# Patient Record
Sex: FEMALE | Race: BLACK OR AFRICAN AMERICAN | Employment: UNEMPLOYED | URBAN - METROPOLITAN AREA
[De-identification: names, ages, dates, MRNs, and addresses within clinical notes are randomized per-mention and may not be internally consistent; named-entity substitution may affect disease eponyms.]

---

## 2020-01-13 ENCOUNTER — HOSPITAL ENCOUNTER (INPATIENT)
Age: 60
LOS: 8 days | Discharge: HOME | DRG: 885 | End: 2020-01-21
Attending: EMERGENCY MEDICINE | Admitting: PSYCHIATRY & NEUROLOGY
Payer: COMMERCIAL

## 2020-01-13 DIAGNOSIS — F32.A DEPRESSION, UNSPECIFIED DEPRESSION TYPE: Primary | ICD-10-CM

## 2020-01-13 LAB
ALBUMIN SERPL-MCNC: 3.5 G/DL (ref 3.5–5)
ALBUMIN/GLOB SERPL: 0.9 {RATIO} (ref 1.1–2.2)
ALP SERPL-CCNC: 110 U/L (ref 45–117)
ALT SERPL-CCNC: 20 U/L (ref 12–78)
AMPHET UR QL SCN: NEGATIVE
ANION GAP SERPL CALC-SCNC: 4 MMOL/L (ref 5–15)
APPEARANCE UR: CLEAR
AST SERPL-CCNC: 12 U/L (ref 15–37)
BACTERIA URNS QL MICRO: NEGATIVE /HPF
BARBITURATES UR QL SCN: NEGATIVE
BASOPHILS # BLD: 0 K/UL (ref 0–0.1)
BASOPHILS NFR BLD: 1 % (ref 0–1)
BENZODIAZ UR QL: NEGATIVE
BILIRUB SERPL-MCNC: 0.3 MG/DL (ref 0.2–1)
BILIRUB UR QL: NEGATIVE
BUN SERPL-MCNC: 13 MG/DL (ref 6–20)
BUN/CREAT SERPL: 11 (ref 12–20)
CALCIUM SERPL-MCNC: 9 MG/DL (ref 8.5–10.1)
CANNABINOIDS UR QL SCN: NEGATIVE
CHLORIDE SERPL-SCNC: 110 MMOL/L (ref 97–108)
CO2 SERPL-SCNC: 30 MMOL/L (ref 21–32)
COCAINE UR QL SCN: POSITIVE
COLOR UR: NORMAL
CREAT SERPL-MCNC: 1.16 MG/DL (ref 0.55–1.02)
DIFFERENTIAL METHOD BLD: ABNORMAL
DRUG SCRN COMMENT,DRGCM: ABNORMAL
EOSINOPHIL # BLD: 0.1 K/UL (ref 0–0.4)
EOSINOPHIL NFR BLD: 2 % (ref 0–7)
EPITH CASTS URNS QL MICRO: NORMAL /LPF
ERYTHROCYTE [DISTWIDTH] IN BLOOD BY AUTOMATED COUNT: 13.2 % (ref 11.5–14.5)
ETHANOL SERPL-MCNC: <10 MG/DL
GLOBULIN SER CALC-MCNC: 3.9 G/DL (ref 2–4)
GLUCOSE SERPL-MCNC: 91 MG/DL (ref 65–100)
GLUCOSE UR STRIP.AUTO-MCNC: NEGATIVE MG/DL
HCT VFR BLD AUTO: 41.2 % (ref 35–47)
HGB BLD-MCNC: 13 G/DL (ref 11.5–16)
HGB UR QL STRIP: NEGATIVE
IMM GRANULOCYTES # BLD AUTO: 0 K/UL (ref 0–0.04)
IMM GRANULOCYTES NFR BLD AUTO: 0 % (ref 0–0.5)
KETONES UR QL STRIP.AUTO: NEGATIVE MG/DL
LEUKOCYTE ESTERASE UR QL STRIP.AUTO: NEGATIVE
LYMPHOCYTES # BLD: 2.3 K/UL (ref 0.8–3.5)
LYMPHOCYTES NFR BLD: 39 % (ref 12–49)
MCH RBC QN AUTO: 28.2 PG (ref 26–34)
MCHC RBC AUTO-ENTMCNC: 31.6 G/DL (ref 30–36.5)
MCV RBC AUTO: 89.4 FL (ref 80–99)
METHADONE UR QL: NEGATIVE
MONOCYTES # BLD: 0.4 K/UL (ref 0–1)
MONOCYTES NFR BLD: 7 % (ref 5–13)
NEUTS SEG # BLD: 3 K/UL (ref 1.8–8)
NEUTS SEG NFR BLD: 51 % (ref 32–75)
NITRITE UR QL STRIP.AUTO: NEGATIVE
NRBC # BLD: 0 K/UL (ref 0–0.01)
NRBC BLD-RTO: 0 PER 100 WBC
OPIATES UR QL: NEGATIVE
PCP UR QL: NEGATIVE
PH UR STRIP: 7.5 [PH] (ref 5–8)
PLATELET # BLD AUTO: 259 K/UL (ref 150–400)
PMV BLD AUTO: 8.6 FL (ref 8.9–12.9)
POTASSIUM SERPL-SCNC: 4.2 MMOL/L (ref 3.5–5.1)
PROT SERPL-MCNC: 7.4 G/DL (ref 6.4–8.2)
PROT UR STRIP-MCNC: NEGATIVE MG/DL
RBC # BLD AUTO: 4.61 M/UL (ref 3.8–5.2)
RBC #/AREA URNS HPF: NORMAL /HPF (ref 0–5)
SODIUM SERPL-SCNC: 144 MMOL/L (ref 136–145)
SP GR UR REFRACTOMETRY: 1.02 (ref 1–1.03)
UA: UC IF INDICATED,UAUC: NORMAL
UROBILINOGEN UR QL STRIP.AUTO: 1 EU/DL (ref 0.2–1)
WBC # BLD AUTO: 5.8 K/UL (ref 3.6–11)
WBC URNS QL MICRO: NORMAL /HPF (ref 0–4)

## 2020-01-13 PROCEDURE — 80053 COMPREHEN METABOLIC PANEL: CPT

## 2020-01-13 PROCEDURE — 74011250637 HC RX REV CODE- 250/637: Performed by: NURSE PRACTITIONER

## 2020-01-13 PROCEDURE — 81001 URINALYSIS AUTO W/SCOPE: CPT

## 2020-01-13 PROCEDURE — 80307 DRUG TEST PRSMV CHEM ANLYZR: CPT

## 2020-01-13 PROCEDURE — 90791 PSYCH DIAGNOSTIC EVALUATION: CPT

## 2020-01-13 PROCEDURE — 85025 COMPLETE CBC W/AUTO DIFF WBC: CPT

## 2020-01-13 PROCEDURE — 36415 COLL VENOUS BLD VENIPUNCTURE: CPT

## 2020-01-13 PROCEDURE — 65220000003 HC RM SEMIPRIVATE PSYCH

## 2020-01-13 PROCEDURE — 99284 EMERGENCY DEPT VISIT MOD MDM: CPT

## 2020-01-13 RX ORDER — TRAZODONE HYDROCHLORIDE 50 MG/1
50 TABLET ORAL
Status: DISCONTINUED | OUTPATIENT
Start: 2020-01-13 | End: 2020-01-16

## 2020-01-13 RX ORDER — HYDROXYZINE 25 MG/1
50 TABLET, FILM COATED ORAL
Status: DISCONTINUED | OUTPATIENT
Start: 2020-01-13 | End: 2020-01-21 | Stop reason: HOSPADM

## 2020-01-13 RX ORDER — ADHESIVE BANDAGE
30 BANDAGE TOPICAL DAILY PRN
Status: DISCONTINUED | OUTPATIENT
Start: 2020-01-13 | End: 2020-01-21 | Stop reason: HOSPADM

## 2020-01-13 RX ORDER — BENZTROPINE MESYLATE 1 MG/1
1 TABLET ORAL
Status: DISCONTINUED | OUTPATIENT
Start: 2020-01-13 | End: 2020-01-21 | Stop reason: HOSPADM

## 2020-01-13 RX ORDER — OLANZAPINE 5 MG/1
5 TABLET ORAL
Status: DISCONTINUED | OUTPATIENT
Start: 2020-01-13 | End: 2020-01-17

## 2020-01-13 RX ORDER — ACETAMINOPHEN 325 MG/1
650 TABLET ORAL
Status: COMPLETED | OUTPATIENT
Start: 2020-01-13 | End: 2020-01-13

## 2020-01-13 RX ORDER — DIPHENHYDRAMINE HYDROCHLORIDE 50 MG/ML
50 INJECTION, SOLUTION INTRAMUSCULAR; INTRAVENOUS
Status: DISCONTINUED | OUTPATIENT
Start: 2020-01-13 | End: 2020-01-21 | Stop reason: HOSPADM

## 2020-01-13 RX ORDER — CLONIDINE HYDROCHLORIDE 0.1 MG/1
0.1 TABLET ORAL
Status: DISCONTINUED | OUTPATIENT
Start: 2020-01-13 | End: 2020-01-21 | Stop reason: HOSPADM

## 2020-01-13 RX ORDER — LORAZEPAM 2 MG/ML
1 INJECTION INTRAMUSCULAR
Status: DISCONTINUED | OUTPATIENT
Start: 2020-01-13 | End: 2020-01-21 | Stop reason: HOSPADM

## 2020-01-13 RX ORDER — HALOPERIDOL 5 MG/ML
5 INJECTION INTRAMUSCULAR
Status: DISCONTINUED | OUTPATIENT
Start: 2020-01-13 | End: 2020-01-21 | Stop reason: HOSPADM

## 2020-01-13 RX ORDER — ACETAMINOPHEN 325 MG/1
650 TABLET ORAL
Status: DISCONTINUED | OUTPATIENT
Start: 2020-01-13 | End: 2020-01-18

## 2020-01-13 RX ADMIN — TRAZODONE HYDROCHLORIDE 50 MG: 50 TABLET ORAL at 21:03

## 2020-01-13 RX ADMIN — HYDROXYZINE HYDROCHLORIDE 50 MG: 25 TABLET, FILM COATED ORAL at 21:03

## 2020-01-13 RX ADMIN — CLONIDINE HYDROCHLORIDE 0.1 MG: 0.1 TABLET ORAL at 17:52

## 2020-01-13 RX ADMIN — ACETAMINOPHEN 650 MG: 325 TABLET ORAL at 15:20

## 2020-01-13 NOTE — PROGRESS NOTES
Problem: Depressed Mood (Adult/Pediatric)  Goal: *STG: State how the use of substances has aided the avoidance of feelings associated with loss  Outcome: Progressing Towards Goal

## 2020-01-13 NOTE — ED NOTES
Natacha Castillo from [facility] is conducting telepsych consult at this time.

## 2020-01-13 NOTE — ED NOTES
States she has plan to harm others, 2 sisters, son and friend, but no specific method as of yet. \"I know I'm going to do it because I know me\".
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
## 2020-01-13 NOTE — ED NOTES
Requesting medication for headache. Discussed with provider. Verbal order for 650 mg tylenol received.

## 2020-01-13 NOTE — ED NOTES
TRANSFER - OUT REPORT:    Verbal report given to Tonja RN(name) on Maddi Byers  being transferred to behavioral health(unit) for routine progression of care       Report consisted of patients Situation, Background, Assessment and   Recommendations(SBAR). Information from the following report(s) SBAR, ED Summary, [facility] and Recent Results was reviewed with the receiving nurse. Lines:       Opportunity for questions and clarification was provided.       Patient transported with:   security

## 2020-01-13 NOTE — BH NOTES
1700 Pt. Arrived to unit at 1700. Pt. Given meal tray and eating in the day room. Skin check performed by writer and Radha Pedraza RN. Personal belongings checked in by Fiz.

## 2020-01-13 NOTE — ED PROVIDER NOTES
61-year-old female with past medical history of hypertension and stroke that arrives via EMS for suicidal ideations. Patient reports feeling depressed for greater than 1 year. States symptoms worsen within the last 2 to 3 months. States she is having some suicidal ideations with thoughts of taking pills. Also reports homicidal ideations wanting to harm her 2 sisters however, she does not have a plan. Of note, patient has never been admitted to psychiatric services at this facility. Denies decreased concentration, inability to sleep or changes in appetite. Patient reports drug use. The history is provided by the patient. Suicidal   Pertinent negatives include no agitation. Pertinent negatives include no shortness of breath, no chest pain, no vomiting, no confusion, no headaches and no nausea. Past Medical History:   Diagnosis Date    Hypertension     Stroke [facility]) 2015       History reviewed. No pertinent surgical history. History reviewed. No pertinent family history. Social History     Socioeconomic History    Marital status: SINGLE     Spouse name: Not on file    Number of children: Not on file    Years of education: Not on file    Highest education level: Not on file   Occupational History    Not on file   Social Needs    Financial resource strain: Not on file    Food insecurity:     Worry: Not on file     Inability: Not on file    Transportation needs:     Medical: Not on file     Non-medical: Not on file   Tobacco Use    Smoking status: Current Every Day Smoker    Smokeless tobacco: Never Used   Substance and Sexual Activity    Alcohol use:  Yes    Drug use: Yes     Types: Marijuana    Sexual activity: Not on file   Lifestyle    Physical activity:     Days per week: Not on file     Minutes per session: Not on file    Stress: Not on file   Relationships    Social connections:     Talks on phone: Not on file     Gets together: Not on file     Attends Congregation service: Not on file     Active member of club or organization: Not on file     Attends meetings of clubs or organizations: Not on file     Relationship status: Not on file    Intimate partner violence:     Fear of current or ex partner: Not on file     Emotionally abused: Not on file     Physically abused: Not on file     Forced sexual activity: Not on file   Other Topics Concern    Not on file   Social History Narrative    Not on file         ALLERGIES: Patient has no known allergies. Review of Systems   Constitutional: Negative for appetite change, chills, fatigue and fever. HENT: Negative for congestion, ear pain and rhinorrhea. Eyes: Negative for pain and itching. Respiratory: Negative for cough, chest tightness, shortness of breath and wheezing. Cardiovascular: Negative for chest pain, palpitations and leg swelling. Gastrointestinal: Negative for abdominal pain, nausea and vomiting. Genitourinary: Negative for dysuria, frequency and urgency. Musculoskeletal: Negative for arthralgias, back pain and joint swelling. Skin: Negative for color change and rash. Neurological: Negative for dizziness, numbness and headaches. Psychiatric/Behavioral: Positive for suicidal ideas. Negative for agitation, confusion, decreased concentration, hallucinations and sleep disturbance. The patient is not nervous/anxious and is not hyperactive. All other systems reviewed and are negative. Vitals:    01/13/20 1525 01/13/20 1527 01/13/20 1627 01/13/20 1749   BP: 151/87  155/90 (!) 171/108   Pulse:   78 83   Resp:   16 18   Temp:   98.1 °F (36.7 °C) 98.4 °F (36.9 °C)   SpO2:  99% 98% 99%   Weight:       Height:                Physical Exam  Vitals signs and nursing note reviewed. Constitutional:       General: She is not in acute distress. Appearance: She is well-developed. HENT:      Head: Normocephalic and atraumatic. Neck:      Musculoskeletal: Normal range of motion and neck supple. Cardiovascular:      Rate and Rhythm: Normal rate and regular rhythm. Pulses: Normal pulses. Heart sounds: Normal heart sounds. Pulmonary:      Effort: Pulmonary effort is normal.      Breath sounds: Normal breath sounds. Abdominal:      General: Bowel sounds are normal.      Palpations: Abdomen is soft. Tenderness: There is no tenderness. There is no guarding or rebound. Skin:     General: Skin is warm and dry. Neurological:      Mental Status: She is alert and oriented to person, place, and time. Deep Tendon Reflexes: Reflexes are normal and symmetric. Psychiatric:         Attention and Perception: Attention and perception normal.         Mood and Affect: Mood is depressed. Affect is flat. Speech: Speech normal.         Behavior: Behavior normal. Behavior is cooperative. Thought Content: Thought content normal.         Cognition and Memory: Cognition normal.         Judgment: Judgment normal.          MDM     DDX: Depression, situational stress, suicidal ideations, bipolar, schizophrenia      ED Course as of Jan 13 1854   Mon Jan 13, 2020   1624 Pt medically cleared. Accepted by Dr Kanika Collado     [NA]      ED Course User Index  [NA] Ritu Baeza NP     60-year-old female with suicidal and homicidal ideations secondary to depression. Patient is medically cleared and accepted for inpatient psychiatric admission for further evaluation.     Procedures

## 2020-01-14 PROBLEM — F43.25 ADJUSTMENT DISORDER WITH MIXED DISTURBANCE OF EMOTIONS AND CONDUCT: Status: ACTIVE | Noted: 2020-01-14

## 2020-01-14 PROBLEM — F33.9 MAJOR DEPRESSIVE DISORDER, RECURRENT EPISODE WITH MELANCHOLIC FEATURES (HCC): Status: ACTIVE | Noted: 2020-01-14

## 2020-01-14 PROBLEM — F14.10 COCAINE USE DISORDER (HCC): Status: ACTIVE | Noted: 2020-01-14

## 2020-01-14 PROBLEM — F32.A DEPRESSIVE DISORDER: Status: RESOLVED | Noted: 2020-01-13 | Resolved: 2020-01-14

## 2020-01-14 PROCEDURE — 74011250637 HC RX REV CODE- 250/637: Performed by: FAMILY MEDICINE

## 2020-01-14 PROCEDURE — 74011250637 HC RX REV CODE- 250/637: Performed by: NURSE PRACTITIONER

## 2020-01-14 PROCEDURE — 74011250637 HC RX REV CODE- 250/637: Performed by: PSYCHIATRY & NEUROLOGY

## 2020-01-14 PROCEDURE — 65220000003 HC RM SEMIPRIVATE PSYCH

## 2020-01-14 RX ORDER — AMLODIPINE BESYLATE 5 MG/1
5 TABLET ORAL DAILY
Status: DISCONTINUED | OUTPATIENT
Start: 2020-01-14 | End: 2020-01-16

## 2020-01-14 RX ORDER — ASPIRIN 81 MG/1
81 TABLET ORAL DAILY
Status: DISCONTINUED | OUTPATIENT
Start: 2020-01-14 | End: 2020-01-21 | Stop reason: HOSPADM

## 2020-01-14 RX ORDER — IBUPROFEN 200 MG
1 TABLET ORAL DAILY
Status: DISCONTINUED | OUTPATIENT
Start: 2020-01-15 | End: 2020-01-16

## 2020-01-14 RX ORDER — MIRTAZAPINE 15 MG/1
15 TABLET, FILM COATED ORAL
Status: DISCONTINUED | OUTPATIENT
Start: 2020-01-14 | End: 2020-01-21 | Stop reason: HOSPADM

## 2020-01-14 RX ORDER — IBUPROFEN 200 MG
1 TABLET ORAL ONCE
Status: COMPLETED | OUTPATIENT
Start: 2020-01-14 | End: 2020-01-15

## 2020-01-14 RX ADMIN — Medication 81 MG: at 10:58

## 2020-01-14 RX ADMIN — HYDROXYZINE HYDROCHLORIDE 50 MG: 25 TABLET, FILM COATED ORAL at 21:02

## 2020-01-14 RX ADMIN — TRAZODONE HYDROCHLORIDE 50 MG: 50 TABLET ORAL at 21:02

## 2020-01-14 RX ADMIN — CLONIDINE HYDROCHLORIDE 0.1 MG: 0.1 TABLET ORAL at 21:06

## 2020-01-14 RX ADMIN — MIRTAZAPINE 15 MG: 15 TABLET, FILM COATED ORAL at 21:06

## 2020-01-14 RX ADMIN — AMLODIPINE BESYLATE 5 MG: 5 TABLET ORAL at 10:56

## 2020-01-14 NOTE — GROUP NOTE
[facility]  GROUP DOCUMENTATION INDIVIDUAL Group Therapy Note Date: 1/14/2020 Group Start Time: 1500 Group End Time: 5489 Group Topic: Recreational/Music Therapy [facility] [place] [address] ACUTE BEHAV [facility], [address] GROUP DOCUMENTATION GROUP Group Therapy Note Attendees: 7 Attendance: Attended Patient's Goal:  To concentrate on selected task Interventions/techniques: Supported-crafts,games,music Follows Directions: Followed directions Interactions: Interacted appropriately Mental Status: Calm Behavior/appearance: Attentive and Cooperative Goals Achieved: Able to engage in interactions and Able to listen to others-completed selected task Additional Notes:   
 
Natalie Bautista

## 2020-01-14 NOTE — BH NOTES
GROUP THERAPY PROGRESS NOTE    Patient is participating in Substance abuse group. Group time: 50 minutes    Personal goal for participation: To understand addiction, criteria for diagnosis, and identify triggers and coping skills. Goal orientation: Personal    Group therapy participation: active    Therapeutic interventions reviewed and discussed: Group discussion of substance use, abuse, and dependence and the DSM 5 criteria for a substance use disorder. Patients were able to self-rate themselves based on the 11 criteria for a substance use disorder and explore their own level of addiction for cigarettes, alcohol, heroin, and other substances. Group discussed how they feel when they are unable to use and ways substance use has hindered their lives. Triggers for use and coping skills to avoid use or manage symptoms until craving subsides were discussed. Impression of participation: Kerri Esquivel shared that she has struggled with using substances over the years but primarily uses marijuana and alcohol. She reports being concerned because she did not use cocaine but reports being told she was positive when admitted. She reports using substances to feel better but it only makes her feel worse because she has more issues afterwards and feels terrible from the hangover or crash.     Danielle [facility]

## 2020-01-14 NOTE — BH NOTES
0730 Assumed care of pt. Pt. ate  breakfast in room. 0930 Pt.resting in room. 1130 Pt. in day room eating lunch. 1330 Pt. resting in room. 1530 Pt. participating in group. 1730 Pt. eating dinner in day room.

## 2020-01-14 NOTE — BH NOTES
1900- Assumed care and received report from off going staff.     1930- At the beginning of the shift patient resting in bed with eyes open. Easily agitated when she was  asked the questions. She stated \" I don't like saying things over and over again\". Denies SI,HI and AVH at this time. 2030-Tolerated bedtime snack     2100- PRN's given for anxiety and sleep     Q15 minutes checks continue for safety    Approx.  8 hours sleep

## 2020-01-14 NOTE — PROGRESS NOTES
[facility] Admission Pharmacy Medication Reconciliation     Recommendations/Findings:   1) Reports the last time she had any medications filled was 3-4 years ago. Denies any current medications. The following changes were made to the PTA medication list:   Additions:   None  Modifications:   None  Deletions:   Hydrochlorothiazide     Total Time Spent: 10 minutes    Information obtained from: Patient interview    Patient allergies:    Allergies as of 01/13/2020    (No Known Allergies)       Prior to admission medications:  None       Thank you,  JOSÉ Gomez BCPS  Desk: [phone] ([id number])  Pharmacy: [phone] ([id number])

## 2020-01-14 NOTE — H&P
INITIAL PSYCHIATRIC EVALUATION            IDENTIFICATION:    Patient Name  Julienne Alvarez   Date of Birth 1960   [facility] [id number]   Medical Record Number  [id number]      Age  63 y.o. PCP None   Admit date:  1/13/2020    Room Number  779/95  @ [facility]   Date of Service  1/14/2020            HISTORY         REASON FOR HOSPITALIZATION:  CC: \"suicide attempt\". Pt admitted under a voluntary basis for suicidal ideations proving to be an imminent danger to self and an inability to care for self. HISTORY OF PRESENT ILLNESS:    The patient, Julienne Alvarez, is a 63 y.o. BLACK OR  female with a past psychiatric history significant for cocaine use disorder, who presents at this time with complaints of (and/or evidence of) the following emotional symptoms: depression and suicide attempt Overdose of: unknown medication approximately 2 days ago. Additional symptomatology include irritability. The above symptoms have been present for 2+ weeks. These symptoms are of moderate to high severity. These symptoms are constant in nature. The patient's condition has been precipitated by psychosocial stressors. Patient's condition made worse by continued illicit drug use and alcohol use as well as treatment noncompliance. UDS: +cocaine; BAL=0. The patient is a fair historian. The patient corroborates the above narrative. The patient contracts for safety on the unit and gives consent for the team to contact collateral. The patient is amenable to initiating treatment while on the unit. The patient reports that she has been doing poorly for over a year, due to various external stressors (unemployment, disagreements with her support sister [sister], loss of power and water due to non-payment of utility bills) and decided to end her life. She states she did not specifically use cocaine recently, but can recall using marijuana within a week of admission.  She reports not taking medication for HTN and HLD despite a history of stroke. ALLERGIES: No Known Allergies   MEDICATIONS PRIOR TO ADMISSION:   No medications prior to admission. PAST MEDICAL HISTORY:   Past Medical History:   Diagnosis Date    Hypertension     Stroke [facility]) 2015   History reviewed. No pertinent surgical history. SOCIAL HISTORY:   Social History     Socioeconomic History    Marital status: SINGLE     Spouse name: Not on file    Number of children: Not on file    Years of education: Not on file    Highest education level: Not on file   Occupational History    Not on file   Social Needs    Financial resource strain: Not on file    Food insecurity:     Worry: Not on file     Inability: Not on file    Transportation needs:     Medical: Not on file     Non-medical: Not on file   Tobacco Use    Smoking status: Current Every Day Smoker    Smokeless tobacco: Never Used   Substance and Sexual Activity    Alcohol use: Yes    Drug use: Yes     Types: Marijuana    Sexual activity: Not on file   Lifestyle    Physical activity:     Days per week: Not on file     Minutes per session: Not on file    Stress: Not on file   Relationships    Social connections:     Talks on phone: Not on file     Gets together: Not on file     Attends Taoism service: Not on file     Active member of club or organization: Not on file     Attends meetings of clubs or organizations: Not on file     Relationship status: Not on file    Intimate partner violence:     Fear of current or ex partner: Not on file     Emotionally abused: Not on file     Physically abused: Not on file     Forced sexual activity: Not on file   Other Topics Concern    Not on file   Social History Narrative    Not on file      FAMILY HISTORY: History reviewed, pertinent family history as below:   History reviewed. No pertinent family history. REVIEW OF SYSTEMS:   Pertinent items are noted in the History of Present Illness.   All other Systems reviewed and are considered negative. MENTAL STATUS EXAM & VITALS     MENTAL STATUS EXAM (MSE):    MSE FINDINGS ARE WITHIN NORMAL LIMITS (WNL) UNLESS OTHERWISE STATED BELOW. ( ALL OF THE BELOW CATEGORIES OF THE MSE HAVE BEEN REVIEWED (reviewed 1/14/2020) AND UPDATED AS DEEMED APPROPRIATE )  General Presentation age appropriate and overweight, cooperative and guarded   Orientation oriented to time, place and person   Vital Signs  See below (reviewed 1/14/2020); Vital Signs (BP, Pulse, & Temp) are within normal limits if not listed below.    Gait and Station Stable/steady, no ataxia   Musculoskeletal System No extrapyramidal symptoms (EPS); no abnormal muscular movements or Tardive Dyskinesia (TD); muscle strength and tone are within normal limits   Language No aphasia or dysarthria   Speech:  normal volume and non-pressured   Thought Processes logical; normal rate of thoughts; fair abstract reasoning/computation   Thought Associations goal directed   Thought Content preoccupations   Suicidal Ideations death wishes   Homicidal Ideations none   Mood:  depressed and anhedonia   Affect:  constricted and mood-congruent   Memory recent  intact   Memory remote:  intact   Concentration/Attention:  intact   Fund of Knowledge average   Insight:  fair   Reliability good   Judgment:  poor          VITALS:     Patient Vitals for the past 24 hrs:   Temp Pulse Resp BP SpO2   01/14/20 0820 97.9 °F (36.6 °C) 71 18 (!) 151/104 100 %   01/13/20 2031 97.9 °F (36.6 °C) 73 18 (!) 126/101 99 %   01/13/20 1749 98.4 °F (36.9 °C) 83 18 (!) 171/108 99 %   01/13/20 1627 98.1 °F (36.7 °C) 78 16 155/90 98 %   01/13/20 1527     99 %   01/13/20 1525    151/87    01/13/20 1255  84 16 (!) 160/106 99 %     Wt Readings from Last 3 Encounters:   01/13/20 98 kg (216 lb)     Temp Readings from Last 3 Encounters:   01/14/20 97.9 °F (36.6 °C)     BP Readings from Last 3 Encounters:   01/14/20 (!) 151/104     Pulse Readings from Last 3 Encounters:   01/14/20 71 DATA     LABORATORY DATA:  Labs Reviewed   DRUG SCREEN, URINE - Abnormal; Notable for the following components:       Result Value    COCAINE POSITIVE (*)     All other components within normal limits   CBC WITH AUTOMATED DIFF - Abnormal; Notable for the following components:    MPV 8.6 (*)     All other components within normal limits   METABOLIC PANEL, COMPREHENSIVE - Abnormal; Notable for the following components:    Chloride 110 (*)     Anion gap 4 (*)     Creatinine 1.16 (*)     BUN/Creatinine ratio 11 (*)     GFR est AA 58 (*)     GFR est non-AA 48 (*)     AST (SGOT) 12 (*)     A-G Ratio 0.9 (*)     All other components within normal limits   URINALYSIS W/ REFLEX CULTURE   ETHYL ALCOHOL     Admission on 01/13/2020   Component Date Value Ref Range Status    AMPHETAMINES 01/13/2020 NEGATIVE   NEG   Final    BARBITURATES 01/13/2020 NEGATIVE   NEG   Final    BENZODIAZEPINES 01/13/2020 NEGATIVE   NEG   Final    COCAINE 01/13/2020 POSITIVE* NEG   Final    METHADONE 01/13/2020 NEGATIVE   NEG   Final    OPIATES 01/13/2020 NEGATIVE   NEG   Final    PCP(PHENCYCLIDINE) 01/13/2020 NEGATIVE   NEG   Final    THC (TH-CANNABINOL) 01/13/2020 NEGATIVE   NEG   Final    Drug screen comment 01/13/2020 (NOTE)   Final    Color 01/13/2020 YELLOW/STRAW    Final    Appearance 01/13/2020 CLEAR  CLEAR   Final    Specific gravity 01/13/2020 1.020  1.003 - 1.030   Final    pH (UA) 01/13/2020 7.5  5.0 - 8.0   Final    Protein 01/13/2020 NEGATIVE   NEG mg/dL Final    Glucose 01/13/2020 NEGATIVE   NEG mg/dL Final    Ketone 01/13/2020 NEGATIVE   NEG mg/dL Final    Bilirubin 01/13/2020 NEGATIVE   NEG   Final    Blood 01/13/2020 NEGATIVE   NEG   Final    Urobilinogen 01/13/2020 1.0  0.2 - 1.0 EU/dL Final    Nitrites 01/13/2020 NEGATIVE   NEG   Final    Leukocyte Esterase 01/13/2020 NEGATIVE   NEG   Final    WBC 01/13/2020 0-4  0 - 4 /hpf Final    RBC 01/13/2020 0-5  0 - 5 /hpf Final    Epithelial cells 01/13/2020 FEW  FEW /lpf Final    Bacteria 01/13/2020 NEGATIVE   NEG /hpf Final    UA:UC IF INDICATED 01/13/2020 CULTURE NOT INDICATED BY UA RESULT  CNI   Final    WBC 01/13/2020 5.8  3.6 - 11.0 K/uL Final    RBC 01/13/2020 4.61  3.80 - 5.20 M/uL Final    HGB 01/13/2020 13.0  11.5 - 16.0 g/dL Final    HCT 01/13/2020 41.2  35.0 - 47.0 % Final    MCV 01/13/2020 89.4  80.0 - 99.0 FL Final    MCH 01/13/2020 28.2  26.0 - 34.0 PG Final    MCHC 01/13/2020 31.6  30.0 - 36.5 g/dL Final    RDW 01/13/2020 13.2  11.5 - 14.5 % Final    PLATELET 38/41/8056 257  150 - 400 K/uL Final    MPV 01/13/2020 8.6* 8.9 - 12.9 FL Final    NRBC 01/13/2020 0.0  0  WBC Final    ABSOLUTE NRBC 01/13/2020 0.00  0.00 - 0.01 K/uL Final    NEUTROPHILS 01/13/2020 51  32 - 75 % Final    LYMPHOCYTES 01/13/2020 39  12 - 49 % Final    MONOCYTES 01/13/2020 7  5 - 13 % Final    EOSINOPHILS 01/13/2020 2  0 - 7 % Final    BASOPHILS 01/13/2020 1  0 - 1 % Final    IMMATURE GRANULOCYTES 01/13/2020 0  0.0 - 0.5 % Final    ABS. NEUTROPHILS 01/13/2020 3.0  1.8 - 8.0 K/UL Final    ABS. LYMPHOCYTES 01/13/2020 2.3  0.8 - 3.5 K/UL Final    ABS. MONOCYTES 01/13/2020 0.4  0.0 - 1.0 K/UL Final    ABS. EOSINOPHILS 01/13/2020 0.1  0.0 - 0.4 K/UL Final    ABS. BASOPHILS 01/13/2020 0.0  0.0 - 0.1 K/UL Final    ABS. IMM.  GRANS. 01/13/2020 0.0  0.00 - 0.04 K/UL Final    DF 01/13/2020 AUTOMATED    Final    Sodium 01/13/2020 144  136 - 145 mmol/L Final    Potassium 01/13/2020 4.2  3.5 - 5.1 mmol/L Final    Chloride 01/13/2020 110* 97 - 108 mmol/L Final    CO2 01/13/2020 30  21 - 32 mmol/L Final    Anion gap 01/13/2020 4* 5 - 15 mmol/L Final    Glucose 01/13/2020 91  65 - 100 mg/dL Final    BUN 01/13/2020 13  6 - 20 MG/DL Final    Creatinine 01/13/2020 1.16* 0.55 - 1.02 MG/DL Final    BUN/Creatinine ratio 01/13/2020 11* 12 - 20   Final    GFR est AA 01/13/2020 58* >60 ml/min/1.73m2 Final    GFR est non-AA 01/13/2020 48* >60 ml/min/1.73m2 Final    Calcium 01/13/2020 9.0  8.5 - 10.1 MG/DL Final    Bilirubin, total 01/13/2020 0.3  0.2 - 1.0 MG/DL Final    ALT (SGPT) 01/13/2020 20  12 - 78 U/L Final    AST (SGOT) 01/13/2020 12* 15 - 37 U/L Final    Alk. phosphatase 01/13/2020 110  45 - 117 U/L Final    Protein, total 01/13/2020 7.4  6.4 - 8.2 g/dL Final    Albumin 01/13/2020 3.5  3.5 - 5.0 g/dL Final    Globulin 01/13/2020 3.9  2.0 - 4.0 g/dL Final    A-G Ratio 01/13/2020 0.9* 1.1 - 2.2   Final    ALCOHOL(ETHYL),SERUM 01/13/2020 <10  <10 MG/DL Final        RADIOLOGY REPORTS:  Results from Hospital Encounter encounter on 06/23/09   XR LUMBAR SPINE 2 OR 3 VIEWS    Narrative Final Report       ICD Codes / Adm. Diagnosis:    /   ACCIDENT  Examination:  L SPINE MAX 3 S  - [id number] - Jun 23 2009  3:42PM    Accession No:  [id number]  Reason:  REASON: INJURY    REPORT:  Lumbosacral spine    INDICATION: Trauma. COMPARISON: None. FINDINGS: AP, lateral and spot lateral views of the lumbar spine demonstrate   normal alignment. The vertebral body heights and disc spaces are   well-preserved. There is no fracture, subluxation or other abnormality. IMPRESSION:  Normal lumbar spine. Interpreting/Reading Doctor: Lana Santamaria ([id number])  Transcribed: n/a on 06/23/2009  Approved: Lana Santamaria ([id number])  06/23/2009             Distribution:  Attending Doctor: Miguelito Dejesus   Alternate Doctor: Miguelito Dejesus        No results found.            MEDICATIONS       ALL MEDICATIONS  Current Facility-Administered Medications   Medication Dose Route Frequency    amLODIPine (NORVASC) tablet 5 mg  5 mg Oral DAILY    aspirin delayed-release tablet 81 mg  81 mg Oral DAILY    mirtazapine (REMERON) tablet 15 mg  15 mg Oral QHS    OLANZapine (ZyPREXA) tablet 5 mg  5 mg Oral Q6H PRN    haloperidol lactate (HALDOL) injection 5 mg  5 mg IntraMUSCular Q6H PRN    benztropine (COGENTIN) tablet 1 mg  1 mg Oral BID PRN    diphenhydrAMINE (BENADRYL) injection 50 mg  50 mg IntraMUSCular BID PRN    hydrOXYzine HCl (ATARAX) tablet 50 mg  50 mg Oral TID PRN    LORazepam (ATIVAN) injection 1 mg  1 mg IntraMUSCular Q4H PRN    traZODone (DESYREL) tablet 50 mg  50 mg Oral QHS PRN    acetaminophen (TYLENOL) tablet 650 mg  650 mg Oral Q4H PRN    magnesium hydroxide (MILK OF MAGNESIA) 400 mg/5 mL oral suspension 30 mL  30 mL Oral DAILY PRN    cloNIDine HCl (CATAPRES) tablet 0.1 mg  0.1 mg Oral Q6H PRN      SCHEDULED MEDICATIONS  Current Facility-Administered Medications   Medication Dose Route Frequency    amLODIPine (NORVASC) tablet 5 mg  5 mg Oral DAILY    aspirin delayed-release tablet 81 mg  81 mg Oral DAILY    mirtazapine (REMERON) tablet 15 mg  15 mg Oral QHS                ASSESSMENT & PLAN        The patient, Isidro Powers, is a 63 y.o.  female who presents at this time for treatment of the following diagnoses:  Patient Active Hospital Problem List:   Major depressive disorder, recurrent episode with melancholic features ([facility] [address] (5/80/1156)    Assessment: patient with significant situational stressors, poor motivation, hopelessness and frustration as well as a recent history of stimulant use. Suspect combination of underlying depression, demoralization and withdrawal dysphoria. Will optimize medically, treat underlying medical comorbidity and start antidepressant. Plan:  - START Remeron 15 mg QHS for MDD  - IGM therapy as tolerated  - Expand database / obtain collateral  - Dispo planning           A coordinated, multidisplinary treatment team (includes the nurse, unit pharmacist,  and [facility]) round was conducted for this initial evaluation with the patient present. The following regarding medications was addressed during rounds with patient: the risks and benefits of the proposed medication. The patient was given the opportunity to ask questions. Informed consent given to the use of the above medications.     I will continue to adjust psychiatric and non-psychiatric medications (see above \"medication\" section and orders section for details) as deemed appropriate & based upon diagnoses and response to treatment. I have reviewed admission (and previous/old) labs and medical tests in the EHR and or transferring hospital documents. I will continue to order blood tests/labs and diagnostic tests as deemed appropriate and review results as they become available (see orders for details). I have reviewed old psychiatric and medical records available in the EHR. I Will order additional psychiatric records from other institutions to further elucidate the nature of patient's psychopathology and review once available. I will gather additional collateral information from friends, family and o/p treatment team to further elucidate the nature of patient's psychopathology and baselline level of psychiatric functioning.       ESTIMATED LENGTH OF STAY:  2-3 days       STRENGTHS:  Exercising self-direction/Resourceful, Interpersonal/supportive relationships (family, friends, peers) and Motivated and ready for change                                        SIGNED:    Page Sam MD  1/14/2020

## 2020-01-14 NOTE — BH NOTES
PSYCHOSOCIAL ASSESSMENT  :Patient identifying info:  Julienne Alvarez is a 63 y.o., female admitted 1/13/2020 12:52 PM     Presenting problem and precipitating factors: \"I'm alone and tired. \"  Pt was admitted voluntarily due to SI and HI and reported recent OD attempt and was interrupted by sister. Pt has been staying with her sister due to inability to pay her bills - no electricity or water. Pt did not disclose HI to treatment team stating that Gladys Garrett was a closest form a support despite their disagreements. Pt open to medication and skill building services. Mental status assessment:  Pt was seen in treatment team this morning. Pt is alert and oriented. Pt denies SI/HI. Pt's mood is depressed, affect is constricted. Pt's thought process is logical.  Pt's insight and judgment is poor, reliability is good. Social work department will continue to coordinate discharge plans. Strengths: Pt has some family support and voluntarily admitted     Collateral information: Gladys Garrett - sister [phone]    Current psychiatric /substance abuse providers and contact info: None    Previous psychiatric/substance abuse providers and response to treatment: none    Family history of mental illness or substance abuse: Unknown     Substance abuse history:  UDS+ cocaine; BAL=0  Pt reports smoking marijuana on Sunday at a party and denies using cocaine. Social History     Tobacco Use    Smoking status: Current Every Day Smoker    Smokeless tobacco: Never Used   Substance Use Topics    Alcohol use: Yes       History of biomedical complications associated with substance abuse : None reported. Patient's current acceptance of treatment or motivation for change: fair    Family constellation: Adult son     Is significant other involved? N/A    Describe support system: sister    Describe living arrangements and home environment:  Pt has been staying with her sister due to inability to pay her bills - no electricity or water.      Health issues:   Hospital Problems  Never Reviewed          Codes Class Noted POA    Adjustment disorder with mixed disturbance of emotions and conduct ICD-10-CM: F43.25  ICD-9-CM: 309.4  2020 Unknown        Cocaine use disorder ([facility] [address] ICD-10-CM: F14.10  ICD-9-CM: 305.60  2020 Unknown        * (Principal) Major depressive disorder, recurrent episode with melancholic features [facility]) REM-92-HL: F33.9  ICD-9-CM: 296.30  2020 Unknown              Trauma history: Unknown     Legal issues: malicious wounding charge in 2012. History of  service: None     Financial status: None     Restorationism/cultural factors: None expressed     Education/work history:  GED. 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      Have you been licensed as a health care professional (current or ):     Leisure and recreation preferences:     Describe coping skills:    Anastasiya Caballero  2020

## 2020-01-14 NOTE — GROUP NOTE
[facility]  GROUP DOCUMENTATION INDIVIDUAL Group Therapy Note Date: 1/14/2020 Group Start Time: 1100 Group End Time: 1200 Group Topic: Topic Group [facility] [facility] ACUTE BEHAV [facility], [address] GROUP DOCUMENTATION GROUP Group Therapy Note Attendees: 8 Attendance: Attended Patient's Goal:  To participate in healthy relationships [employer] game Interventions/techniques: Supported-things pertaining to relationships Follows Directions: Followed directions Interactions: Interacted appropriately Mental Status: Calm Behavior/appearance: Attentive, Cooperative and Needed prompting Goals Achieved: Able to engage in interactions, Able to listen to others and Discussed coping Additional Notes:   
 
Jake Ashley

## 2020-01-14 NOTE — PROGRESS NOTES
Problem: Hypertension  Goal: *Blood pressure within specified parameters  Outcome: Not Progressing Towards Goal     Problem: Hypertension  Goal: *Blood pressure within specified parameters  Outcome: Not Progressing Towards Goal

## 2020-01-14 NOTE — H&P
[facility]  HISTORY AND PHYSICAL    Name:  Estrada Cuevas  MR#:  [id number]  :  1960  ACCOUNT #:  [de-identified]  ADMIT DATE:  2020    CHIEF COMPLAINT:  Consult from [facility] for medical clearance. HISTORY OF PRESENT ILLNESS:  The patient is a 60-year-old female, who presented to emergency room yesterday with intention to harm others. She has a history of hypertension for many years which has not been taking any medication for. States that she was on water pill, \"but did not do anything to me\", so she stopped. History of stroke, does not recall detail of that. Two years ago, she was admitted to [facility], advised to take aspirin, antihyperlipidemic agent, and antihypertensive medications, but she is not taking any medications at this time. Today, denied any chest pain, shortness of breath, nausea or vomiting. PAST MEDICAL HISTORY:    Past Medical History:   Diagnosis Date    Hypertension     Stroke ([facility] [address]      . PAST SURGICAL HISTORY:  History reviewed. No pertinent surgical history. Tino Lafleur FAMILY HISTORY:  History reviewed. No pertinent family history. .    SOCIAL HISTORY:    Social History     Socioeconomic History    Marital status: SINGLE     Spouse name: Not on file    Number of children: Not on file    Years of education: Not on file    Highest education level: Not on file   Occupational History    Not on file   Social Needs    Financial resource strain: Not on file    Food insecurity:     Worry: Not on file     Inability: Not on file    Transportation needs:     Medical: Not on file     Non-medical: Not on file   Tobacco Use    Smoking status: Current Every Day Smoker    Smokeless tobacco: Never Used   Substance and Sexual Activity    Alcohol use:  Yes    Drug use: Yes     Types: Marijuana    Sexual activity: Not on file   Lifestyle    Physical activity:     Days per week: Not on file     Minutes per session: Not on file    Stress: Not on file   Relationships    Social connections:     Talks on phone: Not on file     Gets together: Not on file     Attends Islam service: Not on file     Active member of club or organization: Not on file     Attends meetings of clubs or organizations: Not on file     Relationship status: Not on file    Intimate partner violence:     Fear of current or ex partner: Not on file     Emotionally abused: Not on file     Physically abused: Not on file     Forced sexual activity: Not on file   Other Topics Concern    Not on file   Social History Narrative    Not on file     . ALLERGIES:  No Known Allergies  . REVIEW OF SYSTEMS:  Review of Systems   Constitutional: Negative for chills, fever and weight loss. Respiratory: Negative for cough and shortness of breath. Cardiovascular: Negative for chest pain and palpitations. Musculoskeletal: Negative for back pain, falls, joint pain, myalgias and neck pain. Skin: Negative for rash. Neurological: Negative for dizziness, tingling, tremors, sensory change, focal weakness, weakness and headaches. .    PHYSICAL EXAMINATION:   Visit Vitals  /75   Pulse 84   Temp 98.4 °F (36.9 °C)   Resp 18   Ht 5' 8\" (1.727 m)   Wt 98 kg (216 lb)   SpO2 100%   BMI 32.84 kg/m²     Physical Exam  Constitutional:       General: She is not in acute distress. Appearance: She is not diaphoretic. Eyes:      General: No scleral icterus. Right eye: No discharge. Left eye: No discharge. Conjunctiva/sclera: Conjunctivae normal.      Pupils: Pupils are equal, round, and reactive to light. Neck:      Musculoskeletal: Normal range of motion and neck supple. Thyroid: No thyromegaly. Vascular: No JVD. Trachea: No tracheal deviation. Cardiovascular:      Rate and Rhythm: Regular rhythm. Heart sounds: No murmur. No friction rub. No gallop. Pulmonary:      Effort: Pulmonary effort is normal. No respiratory distress. Breath sounds: Normal breath sounds. No stridor. No wheezing or rales. Chest:      Chest wall: No tenderness. Abdominal:      General: Bowel sounds are normal. There is no distension. Palpations: Abdomen is soft. There is no mass. Tenderness: There is no tenderness. There is no guarding or rebound. Musculoskeletal: Normal range of motion. General: No tenderness. Lymphadenopathy:      Cervical: No cervical adenopathy. Skin:     General: Skin is warm. Coloration: Skin is not pale. Findings: No erythema or rash. Neurological:      Mental Status: She is alert and oriented to person, place, and time. Cranial Nerves: No cranial nerve deficit. Motor: No abnormal muscle tone. Coordination: Coordination normal.      Deep Tendon Reflexes: Reflexes are normal and symmetric. Reflexes normal.   Psychiatric:         Behavior: Behavior normal.         Thought Content: Thought content normal.         Judgment: Judgment normal.         LABS: Results for Eddie Dos Santos (MRN [id number]) as of 1/16/2020 08:12   Ref.  Range 1/13/2020 13:49 1/15/2020 05:35   Sodium Latest Ref Range: 136 - 145 mmol/L 144    Potassium Latest Ref Range: 3.5 - 5.1 mmol/L 4.2    Chloride Latest Ref Range: 97 - 108 mmol/L 110 (H)    CO2 Latest Ref Range: 21 - 32 mmol/L 30    Anion gap Latest Ref Range: 5 - 15 mmol/L 4 (L)    Glucose Latest Ref Range: 65 - 100 mg/dL 91    BUN Latest Ref Range: 6 - 20 MG/DL 13    Creatinine Latest Ref Range: 0.55 - 1.02 MG/DL 1.16 (H)    BUN/Creatinine ratio Latest Ref Range: 12 - 20   11 (L)    Calcium Latest Ref Range: 8.5 - 10.1 MG/DL 9.0    GFR est non-AA Latest Ref Range: >60 ml/min/1.73m2 48 (L)    GFR est AA Latest Ref Range: >60 ml/min/1.73m2 58 (L)    Bilirubin, total Latest Ref Range: 0.2 - 1.0 MG/DL 0.3    Protein, total Latest Ref Range: 6.4 - 8.2 g/dL 7.4    Albumin Latest Ref Range: 3.5 - 5.0 g/dL 3.5    Globulin Latest Ref Range: 2.0 - 4.0 g/dL 3.9    A-G Ratio Latest Ref Range: 1.1 - 2.2   0.9 (L)    ALT (SGPT) Latest Ref Range: 12 - 78 U/L 20    AST Latest Ref Range: 15 - 37 U/L 12 (L)    Alk. phosphatase Latest Ref Range: 45 - 117 U/L 110    Triglyceride Latest Ref Range: <150 MG/DL  118   Cholesterol, total Latest Ref Range: <200 MG/DL  215 (H)   HDL Cholesterol Latest Units: MG/DL  72   CHOL/HDL Ratio Latest Ref Range: 0.0 - 5.0    3.0   VLDL, calculated Latest Units: MG/DL  23.6   LDL, calculated Latest Ref Range: 0 - 100 MG/DL  119.4 (H)   Hemoglobin A1c, (calculated) Latest Ref Range: 4.0 - 5.6 %  5.9 (H)   Est. average glucose Latest Units: mg/dL  123       IMAGING:  No new imaging     ASSESSMENT AND PLAN:  The patient is a 52-year-old admitted  acute psychosis, intention to harm others. Hospitalist consulted for medical clearance. 1.  Hypertension. The patient's blood pressure elevated. We will start the patient on amlodipine. 2.  History of hyperlipidemia. We will consider a statin, check lipid. 4.  History of stroke. Resume aspirin. Follow the patient's lab work. Thank you for consult.     Dominique Salmon MD      [id number]  D:  01/14/2020 9:57  T:  01/14/2020 14:30  JOB #:  [id number]

## 2020-01-15 LAB
CHOLEST SERPL-MCNC: 215 MG/DL
EST. AVERAGE GLUCOSE BLD GHB EST-MCNC: 123 MG/DL
HBA1C MFR BLD: 5.9 % (ref 4–5.6)
HDLC SERPL-MCNC: 72 MG/DL
HDLC SERPL: 3 {RATIO} (ref 0–5)
LDLC SERPL CALC-MCNC: 119.4 MG/DL (ref 0–100)
LIPID PROFILE,FLP: ABNORMAL
TRIGL SERPL-MCNC: 118 MG/DL (ref ?–150)
TSH SERPL DL<=0.05 MIU/L-ACNC: 2.07 UIU/ML (ref 0.36–3.74)
VLDLC SERPL CALC-MCNC: 23.6 MG/DL

## 2020-01-15 PROCEDURE — 74011250637 HC RX REV CODE- 250/637: Performed by: PSYCHIATRY & NEUROLOGY

## 2020-01-15 PROCEDURE — 74011250637 HC RX REV CODE- 250/637: Performed by: FAMILY MEDICINE

## 2020-01-15 PROCEDURE — 83036 HEMOGLOBIN GLYCOSYLATED A1C: CPT

## 2020-01-15 PROCEDURE — 74011250637 HC RX REV CODE- 250/637: Performed by: NURSE PRACTITIONER

## 2020-01-15 PROCEDURE — 80061 LIPID PANEL: CPT

## 2020-01-15 PROCEDURE — 36415 COLL VENOUS BLD VENIPUNCTURE: CPT

## 2020-01-15 PROCEDURE — 84443 ASSAY THYROID STIM HORMONE: CPT

## 2020-01-15 PROCEDURE — 65220000003 HC RM SEMIPRIVATE PSYCH

## 2020-01-15 RX ORDER — ATORVASTATIN CALCIUM 40 MG/1
40 TABLET, FILM COATED ORAL
Status: DISCONTINUED | OUTPATIENT
Start: 2020-01-15 | End: 2020-01-21 | Stop reason: HOSPADM

## 2020-01-15 RX ADMIN — Medication 81 MG: at 08:09

## 2020-01-15 RX ADMIN — ATORVASTATIN CALCIUM 40 MG: 40 TABLET, FILM COATED ORAL at 21:07

## 2020-01-15 RX ADMIN — AMLODIPINE BESYLATE 5 MG: 5 TABLET ORAL at 08:10

## 2020-01-15 RX ADMIN — MIRTAZAPINE 15 MG: 15 TABLET, FILM COATED ORAL at 21:07

## 2020-01-15 RX ADMIN — TRAZODONE HYDROCHLORIDE 50 MG: 50 TABLET ORAL at 21:06

## 2020-01-15 NOTE — BH NOTES
GROUP THERAPY PROGRESS NOTE    Patient is participating in Substance abuse group. Group time: 50 minutes    Personal goal for participation: To understand defensive mechanisms    Goal orientation: Personal    Group therapy participation: active    Therapeutic interventions reviewed and discussed: Group discussion of defense mechanisms used to cope with negative emotions and situations. This includes how people cope in positive or negative ways with these mechanisms. Impression of participation: [facility] arrived late to group but joined once she was settled. She shared that when she has had a bad day she will over react to things at home. Group was discusses dishes not being done and she shared how she would respond and how negative this is. She was open to trying \"I statements\" and listened intently during discussion of how these statements work.     Lizz Gibson LPC LSATP [facility]

## 2020-01-15 NOTE — BH NOTES
1900- Assumed care and received report from off going staff.     1930- At the beginning of the shift patient in her room talking with roommate and reading a book Denies SI,HI and AVH at this time.     2030-Tolerated bedtime snack     2100- PRN's given for anxiety, sleep and Elevated BP     Q15 minutes checks continue for safety     Labs drawn this am     Approx.  7 hours sleep Bedside report given to  Lorenza TRIMBLE.Pt  Resting in the bed.Safety precautions in place and all the lines remain patent.

## 2020-01-15 NOTE — BH NOTES
Behavioral Health Treatment Team Note       Patient goal(s) for today: Engage in unit activities. Treatment team focus/goals: Continue stabilizing on medications and plan for outpatient resources. Progress note:  Pt remains calm and cooperative. Pt endorses depressive thoughts and worried about returning home alone at this time. [facility] referral has been submitted for mental health skill building  Lorene Dawson [phone] to be contacted at discharge. She will be given utility payment resources. LOS:  2  Expected LOS: 1/20    Financial concerns/prescription coverage:  Medicaid  Date of last family contact: None     Family requesting physician contact today:  No  Discharge plan: Home vs. sisters  Guns in the home: None reported. Outpatient provider(s): to be linked. Participating treatment team members: Sabino Durham MSW and Dr. Kaila Rodriguez.

## 2020-01-15 NOTE — GROUP NOTE
[facility]  GROUP DOCUMENTATION INDIVIDUAL Group Therapy Note Date: 1/15/2020 Group Start Time: 1500 Group End Time: 8842 Group Topic: Recreational/Music Therapy [facility] [place] [address] ACUTE BEHAV [facility], [address] GROUP DOCUMENTATION GROUP Group Therapy Note Attendees: 5 Attendance: Did not attend Patient's Goal: Interventions/techniques: 
Jama Pedersen

## 2020-01-15 NOTE — BH NOTES
0730 Assumed care of pt. Pt. states Nicotine patch not effective for her. Pt. Wants to be transferred to general side. MD notified. 0930 Pt. resting in room. 1000 Pt. moved to general side. 1130 Pt. eating lunch in day room. 1330 Pt. resting in room. 1530 Pt. sitting in day room participating in group. 1730 Pt. eating dinner in day room.

## 2020-01-15 NOTE — GROUP NOTE
[facility]  GROUP DOCUMENTATION INDIVIDUAL Group Therapy Note Date: 1/15/2020 Group Start Time: 7892 Group End Time: 1200 Group Topic: Topic Group [address] ACUTE BEHAV [facility], [address] [facility] GROUP DOCUMENTATION GROUP Group Therapy Note Attendees: 5 Attendance: Did not attend Patient's Goal: Interventions/techniques Frederick Potter

## 2020-01-15 NOTE — PROGRESS NOTES
Problem: Hypertension  Goal: *Blood pressure within specified parameters  Outcome: Progressing Towards Goal  Goal: *Labs within defined limits  Outcome: Progressing Towards Goal     Problem: Suicide  Goal: *STG: Remains safe in hospital  Outcome: Progressing Towards Goal  Goal: *STG: Seeks staff when feelings of self harm or harm towards others arise  Outcome: Progressing Towards Goal  Goal: *STG: Attends activities and groups  Outcome: Progressing Towards Goal  Goal: *STG:  Verbalizes alternative ways of dealing with maladaptive feelings/behaviors  Outcome: Progressing Towards Goal  Goal: *STG/LTG: Complies with medication therapy  Outcome: Progressing Towards Goal  Goal: *STG/LTG: No longer expresses self destructive or suicidal thoughts  Outcome: Progressing Towards Goal

## 2020-01-15 NOTE — BH NOTES
PSYCHIATRIC PROGRESS NOTE         Patient Name  Carmencita Cheadle   Date of Birth 1960   [facility] [id number]   Medical Record Number  [id number]      Age  63 y.o. PCP None   Admit date:  1/13/2020    Room Number  321/02  @ [facility]   Date of Service  1/15/2020         E & M PROGRESS NOTE:         HISTORY       CC:  \"suicidal ideation\"  HISTORY OF PRESENT ILLNESS/INTERVAL HISTORY:  (reviewed/updated 1/15/2020). per initial evaluation: The patient, Carmencita Cheadle, is a 63 y.o. BLACK OR  female with a past psychiatric history significant for cocaine use disorder, who presents at this time with complaints of (and/or evidence of) the following emotional symptoms: depression and suicide attempt Overdose of: unknown medication approximately 2 days ago. Additional symptomatology include irritability. The above symptoms have been present for 2+ weeks. These symptoms are of moderate to high severity. These symptoms are constant in nature. The patient's condition has been precipitated by psychosocial stressors. Patient's condition made worse by continued illicit drug use and alcohol use as well as treatment noncompliance. UDS: +cocaine; BAL=0.      The patient is a fair historian. The patient corroborates the above narrative. The patient contracts for safety on the unit and gives consent for the team to contact collateral. The patient is amenable to initiating treatment while on the unit. The patient reports that she has been doing poorly for over a year, due to various external stressors (unemployment, disagreements with her support sister [sister], loss of power and water due to non-payment of utility bills) and decided to end her life. She states she did not specifically use cocaine recently, but can recall using marijuana within a week of admission. She reports not taking medication for HTN and HLD despite a history of stroke. 1/15 - no acute overnight events.  Patient allowed blood draw, and was started on Remeron, which she is tolerating. She is visible, no episodes of self harm but remains depressed; she did not voice active thoughts of self harm this morning. HTN and CAD started agents by medical consult. Patient with mild HLD but elevated LDL, otherwise labwork WNL. Patient counseled on compliance, working with SW to secure a stable discharge plan. SIDE EFFECTS: (reviewed/updated 1/15/2020)  None reported or admitted to. ALLERGIES:(reviewed/updated 1/15/2020)  No Known Allergies   MEDICATIONS PRIOR TO ADMISSION:(reviewed/updated 1/15/2020)  No medications prior to admission. PAST MEDICAL HISTORY: Past medical history from the initial psychiatric evaluation has been reviewed (reviewed/updated 1/15/2020) with no additional updates (I asked patient and no additional past medical history provided). Past Medical History:   Diagnosis Date    Hypertension     Stroke [facility]) 2015   History reviewed. No pertinent surgical history. SOCIAL HISTORY: Social history from the initial psychiatric evaluation has been reviewed (reviewed/updated 1/15/2020) with no additional updates (I asked patient and no additional social history provided). Social History     Socioeconomic History    Marital status: SINGLE     Spouse name: Not on file    Number of children: Not on file    Years of education: Not on file    Highest education level: Not on file   Occupational History    Not on file   Social Needs    Financial resource strain: Not on file    Food insecurity:     Worry: Not on file     Inability: Not on file    Transportation needs:     Medical: Not on file     Non-medical: Not on file   Tobacco Use    Smoking status: Current Every Day Smoker    Smokeless tobacco: Never Used   Substance and Sexual Activity    Alcohol use:  Yes    Drug use: Yes     Types: Marijuana    Sexual activity: Not on file   Lifestyle    Physical activity:     Days per week: Not on file     Minutes per session: Not on file    Stress: Not on file   Relationships    Social connections:     Talks on phone: Not on file     Gets together: Not on file     Attends Latter-day service: Not on file     Active member of club or organization: Not on file     Attends meetings of clubs or organizations: Not on file     Relationship status: Not on file    Intimate partner violence:     Fear of current or ex partner: Not on file     Emotionally abused: Not on file     Physically abused: Not on file     Forced sexual activity: Not on file   Other Topics Concern    Not on file   Social History Narrative    Not on file      FAMILY HISTORY: Family history from the initial psychiatric evaluation has been reviewed (reviewed/updated 1/15/2020) with no additional updates (I asked patient and no additional family history provided). History reviewed. No pertinent family history. REVIEW OF SYSTEMS: (reviewed/updated 1/15/2020)  Appetite:no change from normal   Sleep: poor with DIMS (difficulty initiating & maintaining sleep)   All other Review of Systems: Negative except per HPI         [facility] (MSE):    MSE FINDINGS ARE WITHIN NORMAL LIMITS (WNL) UNLESS OTHERWISE STATED BELOW. ( ALL OF THE BELOW CATEGORIES OF THE MSE HAVE BEEN REVIEWED (reviewed 1/15/2020) AND UPDATED AS DEEMED APPROPRIATE )  General Presentation age appropriate, cooperative and guarded   Orientation oriented to time, place and person   Vital Signs  See below (reviewed 1/15/2020); Vital Signs (BP, Pulse, & Temp) are within normal limits if not listed below.    Gait and Station Stable/steady, no ataxia   Musculoskeletal System No extrapyramidal symptoms (EPS); no abnormal muscular movements or Tardive Dyskinesia (TD); muscle strength and tone are within normal limits   Language No aphasia or dysarthria   Speech:  normal volume and non-pressured   Thought Processes logical; normal rate of thoughts; fair abstract reasoning/computation   Thought Associations goal directed   Thought Content preoccupations   Suicidal Ideations contracts for safety   Homicidal Ideations none   Mood:  depressed and anhedonia   Affect:  mood-congruent   Memory recent  intact   Memory remote:  intact   Concentration/Attention:  intact   Fund of Knowledge average   Insight:  limited   Reliability fair   Judgment:  improving          VITALS:     Patient Vitals for the past 24 hrs:   Temp Pulse Resp BP SpO2   01/15/20 0751 97.4 °F (36.3 °C) 69 18 (!) 142/97 100 %   01/14/20 2025 98.5 °F (36.9 °C) 68 20 (!) 142/100 98 %     Wt Readings from Last 3 Encounters:   01/13/20 98 kg (216 lb)     Temp Readings from Last 3 Encounters:   01/15/20 97.4 °F (36.3 °C)     BP Readings from Last 3 Encounters:   01/15/20 (!) 142/97     Pulse Readings from Last 3 Encounters:   01/15/20 69            DATA     LABORATORY DATA:(reviewed/updated 1/15/2020)  Recent Results (from the past 24 hour(s))   LIPID PANEL    Collection Time: 01/15/20  5:35 AM   Result Value Ref Range    LIPID PROFILE          Cholesterol, total 215 (H) <200 MG/DL    Triglyceride 118 <150 MG/DL    HDL Cholesterol 72 MG/DL    LDL, calculated 119.4 (H) 0 - 100 MG/DL    VLDL, calculated 23.6 MG/DL    CHOL/HDL Ratio 3.0 0.0 - 5.0     TSH 3RD GENERATION    Collection Time: 01/15/20  5:35 AM   Result Value Ref Range    TSH 2.07 0.36 - 3.74 uIU/mL   HEMOGLOBIN A1C WITH EAG    Collection Time: 01/15/20  5:35 AM   Result Value Ref Range    Hemoglobin A1c 5.9 (H) 4.0 - 5.6 %    Est. average glucose 123 mg/dL     No results found for: VALF2, VALAC, VALP, VALPR, DS6, CRBAM, CRBAMP, CARB2, XCRBAM  No results found for: LITHM   RADIOLOGY REPORTS:(reviewed/updated 1/15/2020)  No results found.        MEDICATIONS     ALL MEDICATIONS:   Current Facility-Administered Medications   Medication Dose Route Frequency    amLODIPine (NORVASC) tablet 5 mg  5 mg Oral DAILY    aspirin delayed-release tablet 81 mg  81 mg Oral DAILY    mirtazapine (REMERON) tablet 15 mg  15 mg Oral QHS    nicotine (NICODERM CQ) 21 mg/24 hr patch 1 Patch  1 Patch TransDERmal DAILY    nicotine (NICODERM CQ) 21 mg/24 hr patch 1 Patch  1 Patch TransDERmal ONCE    OLANZapine (ZyPREXA) tablet 5 mg  5 mg Oral Q6H PRN    haloperidol lactate (HALDOL) injection 5 mg  5 mg IntraMUSCular Q6H PRN    benztropine (COGENTIN) tablet 1 mg  1 mg Oral BID PRN    diphenhydrAMINE (BENADRYL) injection 50 mg  50 mg IntraMUSCular BID PRN    hydrOXYzine HCl (ATARAX) tablet 50 mg  50 mg Oral TID PRN    LORazepam (ATIVAN) injection 1 mg  1 mg IntraMUSCular Q4H PRN    traZODone (DESYREL) tablet 50 mg  50 mg Oral QHS PRN    acetaminophen (TYLENOL) tablet 650 mg  650 mg Oral Q4H PRN    magnesium hydroxide (MILK OF MAGNESIA) 400 mg/5 mL oral suspension 30 mL  30 mL Oral DAILY PRN    cloNIDine HCl (CATAPRES) tablet 0.1 mg  0.1 mg Oral Q6H PRN      SCHEDULED MEDICATIONS:   Current Facility-Administered Medications   Medication Dose Route Frequency    amLODIPine (NORVASC) tablet 5 mg  5 mg Oral DAILY    aspirin delayed-release tablet 81 mg  81 mg Oral DAILY    mirtazapine (REMERON) tablet 15 mg  15 mg Oral QHS    nicotine (NICODERM CQ) 21 mg/24 hr patch 1 Patch  1 Patch TransDERmal DAILY    nicotine (NICODERM CQ) 21 mg/24 hr patch 1 Patch  1 Patch TransDERmal ONCE          ASSESSMENT & PLAN     DIAGNOSES REQUIRING ACTIVE TREATMENT AND MONITORING: (reviewed/updated 1/15/2020)  Patient Active Hospital Problem List:   Major depressive disorder, recurrent episode with melancholic features [facility] (2/95/6417)   Major depressive disorder, recurrent episode with melancholic features [facility] (9/71/3609)    Assessment: patient with significant situational stressors, poor motivation, hopelessness and frustration as well as a recent history of stimulant use. Suspect combination of underlying depression, demoralization and withdrawal dysphoria.  Will optimize medically, treat underlying medical comorbidity and start antidepressant. Plan:  - CONTINUE Remeron 15 mg QHS for MDD, will titrate on HD4  - Appreciate medicine recs  - IGM therapy as tolerated  - Expand database / obtain collateral  - Dispo planning     In summary, Carmencita Cheadle, is a 63 y.o.  female who presents with a severe exacerbation of the principal diagnosis of Major depressive disorder, recurrent episode with melancholic features ([address]    Patient's condition is not worsening. Patient requires continued inpatient hospitalization for further stabilization, safety monitoring and medication management. I will continue to coordinate the provision of individual, milieu, occupational, group, and substance abuse therapies to address target symptoms/diagnoses as deemed appropriate for the individual patient. A coordinated, multidisplinary treatment team round was conducted with the patient (this team consists of the nurse, psychiatric unit pharmcist,  and writer). Complete current electronic health record for patient has been reviewed today including consultant notes, ancillary staff notes, nurses and psychiatric tech notes. Suicide risk assessment completed and patient deemed to be of high risk for suicide at this time. The following regarding medications was addressed during rounds with patient:   the risks and benefits of the proposed medication. The patient was given the opportunity to ask questions. Informed consent given to the use of the above medications. Will continue to adjust psychiatric and non-psychiatric medications (see above \"medication\" section and orders section for details) as deemed appropriate & based upon diagnoses and response to treatment. I will continue to order blood tests/labs and diagnostic tests as deemed appropriate and review results as they become available (see orders for details and above listed lab/test results).     I will order psychiatric records from previous psych hospitals to further elucidate the nature of patient's psychopathology and review once available. I will gather additional collateral information from friends, family and o/p treatment team to further elucidate the nature of patient's psychopathology and baselline level of psychiatric functioning. I certify that this patient's inpatient psychiatric hospital services furnished since the previous certification were, and continue to be, required for treatment that could reasonably be expected to improve the patient's condition, or for diagnostic study, and that the patient continues to need, on a daily basis, active treatment furnished directly by or requiring the supervision of inpatient psychiatric facility personnel. In addition, the hospital records show that services furnished were intensive treatment services, admission or related services, or equivalent services.     EXPECTED DISCHARGE DATE/DAY: 1/20/2020     DISPOSITION: Home       Signed By:   Diane Buenrostro MD  1/15/2020

## 2020-01-15 NOTE — GROUP NOTE
IP  GROUP DOCUMENTATION INDIVIDUAL Group Therapy Note Date: 1/15/2020 Group Start Time: 4871 Group End Time: 1800 Group Topic: Nursing [facility] 3 ACUTE BEHAV HLTH Donel Sleet IP  GROUP DOCUMENTATION GROUP Group Therapy Note Attendees:  
 
  
 
Attendance: Attended Interventions/techniques: Promoted peer support Follows Directions: Followed directions Interactions: Interacted appropriately Mental Status: Calm Behavior/appearance: Motivated Goals Achieved: Able to engage in interactions and Able to self-disclose Stuart Forte

## 2020-01-16 PROCEDURE — 74011250637 HC RX REV CODE- 250/637: Performed by: FAMILY MEDICINE

## 2020-01-16 PROCEDURE — 65220000003 HC RM SEMIPRIVATE PSYCH

## 2020-01-16 PROCEDURE — 74011250637 HC RX REV CODE- 250/637: Performed by: STUDENT IN AN ORGANIZED HEALTH CARE EDUCATION/TRAINING PROGRAM

## 2020-01-16 PROCEDURE — 74011250637 HC RX REV CODE- 250/637: Performed by: PSYCHIATRY & NEUROLOGY

## 2020-01-16 PROCEDURE — 74011250637 HC RX REV CODE- 250/637: Performed by: NURSE PRACTITIONER

## 2020-01-16 RX ORDER — ACETAMINOPHEN 500 MG
2 TABLET ORAL
Status: DISCONTINUED | OUTPATIENT
Start: 2020-01-16 | End: 2020-01-21 | Stop reason: HOSPADM

## 2020-01-16 RX ORDER — AMLODIPINE BESYLATE 5 MG/1
10 TABLET ORAL DAILY
Status: DISCONTINUED | OUTPATIENT
Start: 2020-01-17 | End: 2020-01-21 | Stop reason: HOSPADM

## 2020-01-16 RX ORDER — AMLODIPINE BESYLATE 5 MG/1
5 TABLET ORAL ONCE
Status: COMPLETED | OUTPATIENT
Start: 2020-01-16 | End: 2020-01-16

## 2020-01-16 RX ORDER — TRAZODONE HYDROCHLORIDE 100 MG/1
100 TABLET ORAL
Status: DISCONTINUED | OUTPATIENT
Start: 2020-01-16 | End: 2020-01-17

## 2020-01-16 RX ADMIN — ATORVASTATIN CALCIUM 40 MG: 40 TABLET, FILM COATED ORAL at 21:25

## 2020-01-16 RX ADMIN — OLANZAPINE 5 MG: 5 TABLET, FILM COATED ORAL at 23:22

## 2020-01-16 RX ADMIN — AMLODIPINE BESYLATE 5 MG: 5 TABLET ORAL at 08:07

## 2020-01-16 RX ADMIN — NICOTINE POLACRILEX 2 MG: 2 GUM, CHEWING BUCCAL at 21:25

## 2020-01-16 RX ADMIN — MIRTAZAPINE 15 MG: 15 TABLET, FILM COATED ORAL at 21:25

## 2020-01-16 RX ADMIN — Medication 81 MG: at 08:07

## 2020-01-16 RX ADMIN — AMLODIPINE BESYLATE 5 MG: 5 TABLET ORAL at 19:53

## 2020-01-16 RX ADMIN — TRAZODONE HYDROCHLORIDE 100 MG: 100 TABLET ORAL at 21:25

## 2020-01-16 RX ADMIN — HYDROXYZINE HYDROCHLORIDE 50 MG: 25 TABLET, FILM COATED ORAL at 08:54

## 2020-01-16 RX ADMIN — HYDROXYZINE HYDROCHLORIDE 50 MG: 25 TABLET, FILM COATED ORAL at 23:22

## 2020-01-16 RX ADMIN — NICOTINE POLACRILEX 2 MG: 2 GUM, CHEWING BUCCAL at 13:57

## 2020-01-16 NOTE — GROUP NOTE
MAGDALENO  GROUP DOCUMENTATION INDIVIDUAL Group Therapy Note Date: 1/16/2020 Group Start Time: 1000 Group End Time: 1100 Group Topic: Topic Group [facility] ACUTE BE[facility], [address] GROUP DOCUMENTATION GROUP Group Therapy Note Attendees: 4 Attendance: Attended Patient's Goal:  To identify personal affirmations Interventions/techniques: Supported-handout Follows Directions: Followed directions Interactions: Interacted appropriately Mental Status: Calm Behavior/appearance: Attentive and Cooperative Goals Achieved: Able to engage in interactions, Able to listen to others and Discussed coping Additional Notes:   
 
Karissa De Guzman

## 2020-01-16 NOTE — BH NOTES
[facility] Interdisciplinary Rounds     Patient Name: Brodie Strauss  Age: 63 y.o. Room/Bed:  321/02  Primary Diagnosis: Major depressive disorder, recurrent episode with melancholic features [facility]   Admission Status: (V)     Readmission within 30 days:  Power of  in place: Unknown  Patient requires a blocked bed:  No     Reason for blocked bed:    Sleep hours: About 7.5 hours.       Participation in Care/Groups: Selective  Medication Compliant?: Yes  PRNS (last 24 hours): Trazodone    Restraints (last 24 hours): No  Substance Abuse: Yes   24 hour chart check complete:      Patient goal(s) for today  Treatment team focus/goals:  Progress note:    LOS:  3  Expected LOS    Financial concerns/prescription coverage:    Family contact:     Family requesting physician contact today:    Discharge plan:  Access to weapons:         Outpatient provider(s):   Patient's preferred phone number for follow up call:     Participating treatment team members: Brodie Strauss, (assigned SW),

## 2020-01-16 NOTE — GROUP NOTE
MAGDALENO  GROUP DOCUMENTATION INDIVIDUAL Group Therapy Note Date: 1/16/2020 Group Start Time: 8429 Group End Time: 1800 Group Topic: Nursing [facility] 3 ACUTE BEHAV HLTH Chhaya Joe [facility] GROUP DOCUMENTATION GROUP Group Therapy Note Attendees:  
 
  
 
Attendance: Attended Interventions/techniques: Promoted peer support Follows Directions: Followed directions Interactions: Interacted appropriately Mental Status: Calm and Flat Behavior/appearance: Attentive Goals Achieved: Able to engage in interactions Reece Frank

## 2020-01-16 NOTE — PROGRESS NOTES
Problem: Suicide  Goal: *STG: Remains safe in hospital  Outcome: Progressing Towards Goal  Goal: *STG: Attends activities and groups  Outcome: Progressing Towards Goal  Goal: *STG/LTG: Complies with medication therapy  Outcome: Progressing Towards Goal

## 2020-01-16 NOTE — PROGRESS NOTES
Problem: Hypertension  Goal: *Blood pressure within specified parameters  Outcome: Progressing Towards Goal     Problem: Suicide  Goal: *STG: Remains safe in hospital  Outcome: Progressing Towards Goal     Problem: Suicide  Goal: *STG/LTG: Complies with medication therapy  Outcome: Progressing Towards Goal

## 2020-01-16 NOTE — BH NOTES
PSYCHIATRIC PROGRESS NOTE         Patient Name  Tyrel Merrill   Date of Birth 1960   [facility] [id number]   Medical Record Number  [id number]      Age  63 y.o. PCP None   Admit date:  1/13/2020    Room Number  321/02  @ [facility]   Date of Service  1/16/2020         E & M PROGRESS NOTE:         HISTORY       CC:  \"suicidal ideation\"  HISTORY OF PRESENT ILLNESS/INTERVAL HISTORY:  (reviewed/updated 1/16/2020). per initial evaluation: The patient, Tyrel Merrill, is a 63 y.o. BLACK OR  female with a past psychiatric history significant for cocaine use disorder, who presents at this time with complaints of (and/or evidence of) the following emotional symptoms: depression and suicide attempt Overdose of: unknown medication approximately 2 days ago. Additional symptomatology include irritability. The above symptoms have been present for 2+ weeks. These symptoms are of moderate to high severity. These symptoms are constant in nature. The patient's condition has been precipitated by psychosocial stressors. Patient's condition made worse by continued illicit drug use and alcohol use as well as treatment noncompliance. UDS: +cocaine; BAL=0.      The patient is a fair historian. The patient corroborates the above narrative. The patient contracts for safety on the unit and gives consent for the team to contact collateral. The patient is amenable to initiating treatment while on the unit. The patient reports that she has been doing poorly for over a year, due to various external stressors (unemployment, disagreements with her support sister [sister], loss of power and water due to non-payment of utility bills) and decided to end her life. She states she did not specifically use cocaine recently, but can recall using marijuana within a week of admission. She reports not taking medication for HTN and HLD despite a history of stroke. 1/15 - no acute overnight events.  Patient allowed blood draw, and was started on Remeron, which she is tolerating. She is visible, no episodes of self harm but remains depressed; she did not voice active thoughts of self harm this morning. HTN and CAD started agents by medical consult. Patient with mild HLD but elevated LDL, otherwise labwork WNL. Patient counseled on compliance, working with SW to secure a stable discharge plan. 1/16 - patient voiced HI toward her sister and son, but presently denies intent. Patient with ongoing SI, endorses hopelessness, but denies active thoughts of self harm. Per staff, patient stated she made some bizarre statements about \"moving the world\" but does not appear internally preoccupied. Patient states she can't sleep, got trazodone without much effect. Counseled on sleep hygiene. Patient states she would like to her have her oldest sister involved in her care, requesting family meeting. SIDE EFFECTS: (reviewed/updated 1/16/2020)  None reported or admitted to. ALLERGIES:(reviewed/updated 1/16/2020)  No Known Allergies   MEDICATIONS PRIOR TO ADMISSION:(reviewed/updated 1/16/2020)  No medications prior to admission. PAST MEDICAL HISTORY: Past medical history from the initial psychiatric evaluation has been reviewed (reviewed/updated 1/16/2020) with no additional updates (I asked patient and no additional past medical history provided). Past Medical History:   Diagnosis Date    Hypertension     Stroke [facility]) 2015   History reviewed. No pertinent surgical history. SOCIAL HISTORY: Social history from the initial psychiatric evaluation has been reviewed (reviewed/updated 1/16/2020) with no additional updates (I asked patient and no additional social history provided).    Social History     Socioeconomic History    Marital status: SINGLE     Spouse name: Not on file    Number of children: Not on file    Years of education: Not on file    Highest education level: Not on file   Occupational History    Not on file Social Needs    Financial resource strain: Not on file    Food insecurity:     Worry: Not on file     Inability: Not on file    Transportation needs:     Medical: Not on file     Non-medical: Not on file   Tobacco Use    Smoking status: Current Every Day Smoker    Smokeless tobacco: Never Used   Substance and Sexual Activity    Alcohol use: Yes    Drug use: Yes     Types: Marijuana    Sexual activity: Not on file   Lifestyle    Physical activity:     Days per week: Not on file     Minutes per session: Not on file    Stress: Not on file   Relationships    Social connections:     Talks on phone: Not on file     Gets together: Not on file     Attends Rastafarian service: Not on file     Active member of club or organization: Not on file     Attends meetings of clubs or organizations: Not on file     Relationship status: Not on file    Intimate partner violence:     Fear of current or ex partner: Not on file     Emotionally abused: Not on file     Physically abused: Not on file     Forced sexual activity: Not on file   Other Topics Concern    Not on file   Social History Narrative    Not on file      FAMILY HISTORY: Family history from the initial psychiatric evaluation has been reviewed (reviewed/updated 1/16/2020) with no additional updates (I asked patient and no additional family history provided). History reviewed. No pertinent family history.     REVIEW OF SYSTEMS: (reviewed/updated 1/16/2020)  Appetite:no change from normal   Sleep: poor with DIMS (difficulty initiating & maintaining sleep)   All other Review of Systems: Negative except per HPI         [facility] (MSE):    MSE FINDINGS ARE WITHIN NORMAL LIMITS (WNL) UNLESS OTHERWISE STATED BELOW. ( ALL OF THE BELOW CATEGORIES OF THE MSE HAVE BEEN REVIEWED (reviewed 1/16/2020) AND UPDATED AS DEEMED APPROPRIATE )  General Presentation age appropriate, cooperative and guarded   Orientation oriented to time, place and person   Vital Signs  See below (reviewed 1/16/2020); Vital Signs (BP, Pulse, & Temp) are within normal limits if not listed below. Gait and Station Stable/steady, no ataxia   Musculoskeletal System No extrapyramidal symptoms (EPS); no abnormal muscular movements or Tardive Dyskinesia (TD); muscle strength and tone are within normal limits   Language No aphasia or dysarthria   Speech:  normal volume and non-pressured   Thought Processes logical; normal rate of thoughts; fair abstract reasoning/computation   Thought Associations goal directed   Thought Content preoccupations   Suicidal Ideations contracts for safety   Homicidal Ideations none   Mood:  depressed and anhedonia   Affect:  mood-congruent   Memory recent  intact   Memory remote:  intact   Concentration/Attention:  intact   Fund of Knowledge average   Insight:  limited   Reliability fair   Judgment:  improving          VITALS:     Patient Vitals for the past 24 hrs:   Temp Pulse Resp BP SpO2   01/16/20 1034  84  107/75    01/16/20 0822 98.4 °F (36.9 °C) (!) 118 18 (!) 154/106 100 %   01/15/20 1937 98.2 °F (36.8 °C) 74 18 133/90 100 %     Wt Readings from Last 3 Encounters:   01/13/20 98 kg (216 lb)     Temp Readings from Last 3 Encounters:   01/16/20 98.4 °F (36.9 °C)     BP Readings from Last 3 Encounters:   01/16/20 107/75     Pulse Readings from Last 3 Encounters:   01/16/20 84            DATA     LABORATORY DATA:(reviewed/updated 1/16/2020)  No results found for this or any previous visit (from the past 24 hour(s)). No results found for: VALF2, VALAC, VALP, VALPR, DS6, CRBAM, CRBAMP, CARB2, XCRBAM  No results found for: LITHM   RADIOLOGY REPORTS:(reviewed/updated 1/16/2020)  No results found.        MEDICATIONS     ALL MEDICATIONS:   Current Facility-Administered Medications   Medication Dose Route Frequency    atorvastatin (LIPITOR) tablet 40 mg  40 mg Oral QHS    amLODIPine (NORVASC) tablet 5 mg  5 mg Oral DAILY    aspirin delayed-release tablet 81 mg  81 mg Oral DAILY    mirtazapine (REMERON) tablet 15 mg  15 mg Oral QHS    nicotine (NICODERM CQ) 21 mg/24 hr patch 1 Patch  1 Patch TransDERmal DAILY    OLANZapine (ZyPREXA) tablet 5 mg  5 mg Oral Q6H PRN    haloperidol lactate (HALDOL) injection 5 mg  5 mg IntraMUSCular Q6H PRN    benztropine (COGENTIN) tablet 1 mg  1 mg Oral BID PRN    diphenhydrAMINE (BENADRYL) injection 50 mg  50 mg IntraMUSCular BID PRN    hydrOXYzine HCl (ATARAX) tablet 50 mg  50 mg Oral TID PRN    LORazepam (ATIVAN) injection 1 mg  1 mg IntraMUSCular Q4H PRN    traZODone (DESYREL) tablet 50 mg  50 mg Oral QHS PRN    acetaminophen (TYLENOL) tablet 650 mg  650 mg Oral Q4H PRN    magnesium hydroxide (MILK OF MAGNESIA) 400 mg/5 mL oral suspension 30 mL  30 mL Oral DAILY PRN    cloNIDine HCl (CATAPRES) tablet 0.1 mg  0.1 mg Oral Q6H PRN      SCHEDULED MEDICATIONS:   Current Facility-Administered Medications   Medication Dose Route Frequency    atorvastatin (LIPITOR) tablet 40 mg  40 mg Oral QHS    amLODIPine (NORVASC) tablet 5 mg  5 mg Oral DAILY    aspirin delayed-release tablet 81 mg  81 mg Oral DAILY    mirtazapine (REMERON) tablet 15 mg  15 mg Oral QHS    nicotine (NICODERM CQ) 21 mg/24 hr patch 1 Patch  1 Patch TransDERmal DAILY          ASSESSMENT & PLAN     DIAGNOSES REQUIRING ACTIVE TREATMENT AND MONITORING: (reviewed/updated 1/16/2020)  Patient Active Hospital Problem List:   Major depressive disorder, recurrent episode with melancholic features ([facility] [address] (3/85/8590)   Major depressive disorder, recurrent episode with melancholic features ([facility] [address] (3/06/0914)    Assessment: patient with significant situational stressors, poor motivation, hopelessness and frustration as well as a recent history of stimulant use. Suspect combination of underlying depression, demoralization and withdrawal dysphoria. Will optimize medically, treat underlying medical comorbidity and start antidepressant.     Plan:  - CONTINUE Remeron 15 mg QHS for MDD, will titrate on HD4  - Appreciate medicine recs  - IGM therapy as tolerated  - Expand database / obtain collateral  - Dispo planning     In summary, Berenice Oliveira, is a 63 y.o.  female who presents with a severe exacerbation of the principal diagnosis of Major depressive disorder, recurrent episode with melancholic features ([facility] [address]    Patient's condition is not worsening. Patient requires continued inpatient hospitalization for further stabilization, safety monitoring and medication management. I will continue to coordinate the provision of individual, milieu, occupational, group, and substance abuse therapies to address target symptoms/diagnoses as deemed appropriate for the individual patient. A coordinated, multidisplinary treatment team round was conducted with the patient (this team consists of the nurse, psychiatric unit pharmcist,  and writer). Complete current electronic health record for patient has been reviewed today including consultant notes, ancillary staff notes, nurses and psychiatric tech notes. Suicide risk assessment completed and patient deemed to be of high risk for suicide at this time. The following regarding medications was addressed during rounds with patient:   the risks and benefits of the proposed medication. The patient was given the opportunity to ask questions. Informed consent given to the use of the above medications. Will continue to adjust psychiatric and non-psychiatric medications (see above \"medication\" section and orders section for details) as deemed appropriate & based upon diagnoses and response to treatment. I will continue to order blood tests/labs and diagnostic tests as deemed appropriate and review results as they become available (see orders for details and above listed lab/test results).     I will order psychiatric records from previous [facility] hospitals to further elucidate the nature of patient's psychopathology and review once available. I will gather additional collateral information from friends, family and o/p treatment team to further elucidate the nature of patient's psychopathology and baselline level of psychiatric functioning. I certify that this patient's inpatient psychiatric hospital services furnished since the previous certification were, and continue to be, required for treatment that could reasonably be expected to improve the patient's condition, or for diagnostic study, and that the patient continues to need, on a daily basis, active treatment furnished directly by or requiring the supervision of inpatient psychiatric facility personnel. In addition, the hospital records show that services furnished were intensive treatment services, admission or related services, or equivalent services.     EXPECTED DISCHARGE DATE/DAY: 1/20/2020     DISPOSITION: Home       Signed By:   Russ Marcum MD  1/16/2020

## 2020-01-16 NOTE — GROUP NOTE
MAGDAL[facility]  GROUP DOCUMENTATION INDIVIDUAL Group Therapy Note Date: 1/16/2020 Group Start Time: 1500 Group End Time: 1451 Group Topic: Recreational/Music Therapy [facility] [place] [address] ACUTE BEHAV [facility], [address] GROUP DOCUMENTATION GROUP Group Therapy Note Attendees: 6 Attendance: Attended Patient's Goal:  To concentrate on selected task Interventions/techniques: Supported-crafts,games,music Follows Directions: Followed directions Interactions: Interacted appropriately Mental Status: Calm Behavior/appearance: Attentive and Cooperative Goals Achieved: Able to engage in interactions and Able to listen to others-completed selected task Additional Notes:   
 
Felix Saini

## 2020-01-16 NOTE — BH NOTES
Assumed care for the patient. Patient was cooperative with the assessment. Patient denied S.I./AH. Patient admitted to having H. I towards her son and her nephew because of the way they treated the patient, as stated by the patient. Patient also stated that she sees black flashing light floating around and feels like she can move the world. Patient is medicine compliant. Patient received PRN PO Trazodone for insomnia at 2106 . Will continue to monitor. Patient slept for about 7.5hours.

## 2020-01-16 NOTE — PROGRESS NOTES
GROUP THERAPY PROGRESS NOTE      Juanita Mendoza was present for medication group. GROUP TIME: 45 minutes, Thursdays 2pm    PERSONAL GOAL FOR PARTICIPATION: To be present for group, participate in discussion, and answer patient-directed questions. GOAL ORIENTATION: Personal    THERAPEUTIC INTERVENTIONS REVIEWED AND DISCUSSED: The following topics were presented: storage of medications, how to remember to refill medications and keep up with doctor appointments, relapse prevention, keeping a record of all medication including prescription and non-prescription drugs, and who to contact with medication questions. Patients were given time to ask questions regarding their current therapy. IMPRESSION OF PARTICIPATION: [facility] was engaged and was an active participant in group discussions. She was the winner of medication BINGO and received a pillbox as the prize.        Lidya Garcia, MICHAELD, BCPS

## 2020-01-16 NOTE — BH NOTES
1100 This writer assumed care of the patient. 1342 Pt presents with elevated /94.  This writer put a consult in for the pt to be seen by a hospitalist.

## 2020-01-16 NOTE — BH NOTES
Patient goal(s) for today: Participate in group therapy  Treatment team focus/goals: Continue adjusting medications and plan for outpatient follow up. Progress note: Patient continues to attend groups and remain calm and cooperative. She worries about discharge and her support systema nd would like her sister Dorothea Flores involved but she is currently estranged from her.     LOS:  3  Expected LOS: Monday 1/20    Financial concerns/prescription coverage: Medicaid  Family contact: Patient requesting contact with Dorothea Flores (sister) to schedule family session before discharge, [phone] or [phone]  Family requesting physician contact today: No  Discharge plan: Home vs sisters  Access to weapons: none reported  Outpatient provider(s): to be linked      Participating treatment team members: Shahid Bonilla (Counselor/), Dr Alie Adams, [facility] Jeane Gasca

## 2020-01-17 PROCEDURE — 74011250637 HC RX REV CODE- 250/637: Performed by: FAMILY MEDICINE

## 2020-01-17 PROCEDURE — 74011250637 HC RX REV CODE- 250/637: Performed by: PSYCHIATRY & NEUROLOGY

## 2020-01-17 PROCEDURE — 65220000003 HC RM SEMIPRIVATE PSYCH

## 2020-01-17 PROCEDURE — 74011250637 HC RX REV CODE- 250/637: Performed by: STUDENT IN AN ORGANIZED HEALTH CARE EDUCATION/TRAINING PROGRAM

## 2020-01-17 RX ORDER — DOXEPIN HYDROCHLORIDE 10 MG/1
10 CAPSULE ORAL
Status: DISCONTINUED | OUTPATIENT
Start: 2020-01-17 | End: 2020-01-21 | Stop reason: HOSPADM

## 2020-01-17 RX ADMIN — MIRTAZAPINE 15 MG: 15 TABLET, FILM COATED ORAL at 21:24

## 2020-01-17 RX ADMIN — AMLODIPINE BESYLATE 10 MG: 5 TABLET ORAL at 08:58

## 2020-01-17 RX ADMIN — Medication 81 MG: at 08:58

## 2020-01-17 RX ADMIN — TRAZODONE HYDROCHLORIDE 150 MG: 100 TABLET ORAL at 21:24

## 2020-01-17 RX ADMIN — ATORVASTATIN CALCIUM 40 MG: 40 TABLET, FILM COATED ORAL at 21:23

## 2020-01-17 NOTE — BH NOTES
1930  [facility] Assumed care of the patient. When pt was asked about her mood she stated, \"I have a headache in my eye. \"  Pt rated headache as 5/10  Pt declined prn pain medication stating, \"it doesn't work. \"  Pt stated she just wants her scheduled medicine. Pt rates anxiety as 0 and depression as 8/10, pt stated she is \"always depressed,\" and \"always has SI/HI. \"  Pt stated she is thinking about her sister because she is mad at her and currently thinking about her dog. When pt was asked about how she would hurt herself or others, pt became irritated and guarded, \"Why would I tell you that, I'm not answering anymore questions! \"  This writer asked pt when last bm was and after a brief pause, pt stated \"today. \"  Labile mood. Pt compliant with evening meds and received prn Trazodone and nicorette gum at 2125.    2200  Pt in bed reading     2300  Pt in bed awake    2330 Pt in hallway stating she still can't sleep; pt given prn atarax and zyprexa both at 2332    0000  Pt in bed awake    0100  Pt in bed asleep    0200  Pt in bed asleep    0300  Pt in bed asleep    0400  Pt in bed asleep    0500  Pt in bed asleep    0600  Pt in bed asleep    0630  Pt has slept 5 1/2 hours so far this shift.

## 2020-01-17 NOTE — GROUP NOTE
[facility]  GROUP DOCUMENTATION INDIVIDUAL Group Therapy Note Date: 1/17/2020 Group Start Time: 1400 Group End Time: 1500 Group Topic: Recreational/Music Therapy [facility] [address] ACUTE BEHAV [facility] [place], [address] [facility] GROUP DOCUMENTATION GROUP Group Therapy Note Attendees: 9 Attendance: Attended Patient's Goal:  To concentrate on selected task Interventions/techniques: Supported-crafts,games,music Follows Directions: Followed directions Interactions: Interacted appropriately Mental Status: Calm Behavior/appearance: Attentive and Cooperative Goals Achieved: Able to engage in interactions and Able to listen to others Additional Notes:   
 
Beulah Leiva

## 2020-01-17 NOTE — GROUP NOTE
MAGDAL[facility]  GROUP DOCUMENTATION INDIVIDUAL Group Therapy Note Date: 1/17/2020 Group Start Time: 1000 Group End Time: 1100 Group Topic: Topic Group [facility] [place] [address] ACUTE BEHAV [facility] [place], [address] [facility] GROUP DOCUMENTATION GROUP Group Therapy Note Attendees: 9 Attendance: Did not attend Patient's Goal: Interventions/techniquesRenetta Bueno

## 2020-01-17 NOTE — CONSULTS
Hospitalist Consultation Note    NAME:  Rosie Cruz   :   1960   MRN:   [id number]   Room Number: 811/85  @ [facility]     ATTENDING: Day Cantu MD  PCP:  None    Date/Time:  2020 3:05 PM      Recommendations/Plan:       Principal Problem:    Major depressive disorder, recurrent episode with melancholic features ([facility] ()    Active Problems:  Adjustment disorder with mixed disturbance of emotions and conduct (2020)  Cocaine use disorder ([facility] (2020)  Hypertension  Hyperlipidemia  Hx of CVA  Pre-diabetes  Lab Results   Component Value Date/Time    Cholesterol, total 215 (H) 01/15/2020 05:35 AM    HDL Cholesterol 72 01/15/2020 05:35 AM    LDL, calculated 119.4 (H) 01/15/2020 05:35 AM    VLDL, calculated 23.6 01/15/2020 05:35 AM    Triglyceride 118 01/15/2020 05:35 AM    CHOL/HDL Ratio 3.0 01/15/2020 05:35 AM     Lab Results   Component Value Date/Time    Hemoglobin A1c 5.9 (H) 01/15/2020 05:35 AM       PLAN:  - Psychiatric treatment and management of health issues,Defer to psychiatrist for further management. -  Increase amlodipine to 10 mg daily. Continue atorvastatin 40 mg daily. 1.5g Na diet. Does not need medications for pre-diabetes. Counseled on Lifestyle modifications, ADA Diet ,weight loss strategies. - Medically stable at this time. We will follow up on a p.r.n. basis. - No VTE prophylaxis indicated or warranted at this time. BP responded well to dose adjustment of amlodipine. Medicine will sign off. Subjective:   REQUESTING PHYSICIAN:  REASON FOR CONSULT:      Treva Ferrell is a 63 y.o.  female who I was asked to see for elevated blood pressure. Patient presented with depression with suicidal ideation ttriggered by various external stressors and decided to end her life by medication overdose. She is non compliant with medications for hypertension and hyperlipidemia.  She has been undergoing treatment for depression, SI, HI. Patient denies any past medical history except as listed above. Denies fever,chills,chest pain, sob,abd pan,diarrhea,constipation,lighhadedness. No current medical concerns at this time. Past Medical History:   Diagnosis Date    Hypertension     Stroke [facility]) 2015      History reviewed. No pertinent surgical history. Social History     Tobacco Use    Smoking status: Current Every Day Smoker    Smokeless tobacco: Never Used   Substance Use Topics    Alcohol use: Yes      History reviewed. No pertinent family history. No Known Allergies   Prior to Admission medications    Not on File       REVIEW OF SYSTEMS:     Total of 12 systems reviewed as follows:   I am not able to complete the review of systems because:    The patient is intubated and sedated    The patient has altered mental status due to his acute medical problems    The patient has baseline aphasia from prior stroke(s)    The patient has baseline dementia and is not reliable historian                 POSITIVE= underlined text  Negative = text not underlined  General:  fever, chills, sweats, generalized weakness, weight loss/gain,      loss of appetite   Eyes:    blurred vision, eye pain, loss of vision, double vision  ENT:    rhinorrhea, pharyngitis   Respiratory:   cough, sputum production, SOB, wheezing, UNGER, pleuritic pain   Cardiology:   chest pain, palpitations, orthopnea, PND, edema, syncope   Gastrointestinal:  abdominal pain , N/V, dysphagia, diarrhea, constipation, bleeding   Genitourinary:  frequency, urgency, dysuria, hematuria, incontinence   Muskuloskeletal :  arthralgia, myalgia   Hematology:  easy bruising, nose or gum bleeding, lymphadenopathy   Dermatological: rash, ulceration, pruritis   Endocrine:   hot flashes or polydipsia   Neurological:  headache, dizziness, confusion, focal weakness, paresthesia,     Speech difficulties, memory loss, gait disturbance  Psychological: Feelings of anxiety, depression, agitation    Objective:   VITALS:    Visit Vitals  BP (!) 121/95 (BP 1 Location: Right arm, BP Patient Position: Sitting)   Pulse (!) 101   Temp 98.4 °F (36.9 °C)   Resp 18   Ht 5' 8\" (1.727 m)   Wt 98 kg (216 lb)   SpO2 100%   BMI 32.84 kg/m²     Temp (24hrs), Av.4 °F (36.9 °C), Min:98.4 °F (36.9 °C), Max:98.4 °F (36.9 °C)      PHYSICAL EXAM:   General:    Alert, cooperative, no distress, appears stated age. HEENT: Atraumatic, anicteric sclerae, pink conjunctivae     No oral ulcers, mucosa moist, throat clear  Neck:  Supple, symmetrical,  thyroid: non tender  Lungs:   Clear to auscultation bilaterally. No Wheezing or Rhonchi. No rales. Chest wall:  No tenderness  No Accessory muscle use. Heart:   Regular  rhythm,  No  murmur   No edema  Abdomen:   Soft, non-tender. Not distended. Bowel sounds normal  Extremities: No cyanosis. No clubbing  Skin:     Not pale. Not Jaundiced  No rashes   Psych:  Good insight. Not depressed. Not anxious or agitated. Neurologic: EOMs intact. No facial asymmetry. No aphasia or slurred speech. Symmetrical strength, Alert and oriented X 4.     _______________________________________________________________________  Care Plan discussed with:    Comments   Patient     Family      RN     Care Manager                    Consultant:      ____________________________________________________________________  TOTAL TIME:     20 mins    Comments     Reviewed previous records   >50% of visit spent in counseling and coordination of care  Discussion with patient and/or family and questions answered       Critical Care Provided     Minutes non procedure based  ________________________________________________________________________  Signed: Fredi Frost MD      Procedures: see electronic medical records for all procedures/Xrays and details which were not copied into this note but were reviewed prior to creation of Plan.     LAB DATA REVIEWED:    No results found for this or any previous visit (from the past 24 hour(s)).     _____________________________  Hospitalist: Tila Suarez MD

## 2020-01-17 NOTE — PROGRESS NOTES
Problem: Suicide  Goal: *STG: Remains safe in hospital  Outcome: Progressing Towards Goal  Goal: *STG: Seeks staff when feelings of self harm or harm towards others arise  Outcome: Progressing Towards Goal  Goal: *STG: Attends activities and groups  Outcome: Progressing Towards Goal  Goal: *STG:  Verbalizes alternative ways of dealing with maladaptive feelings/behaviors  Outcome: Progressing Towards Goal

## 2020-01-17 NOTE — BH NOTES
PSYCHIATRIC PROGRESS NOTE         Patient Name  Rod Carlos   Date of Birth 1960   [facility] [id number]   Medical Record Number  [id number]      Age  63 y.o. PCP None   Admit date:  1/13/2020    Room Number  321/02  @ [facility]   Date of Service  1/17/2020         E & M PROGRESS NOTE:         HISTORY       CC:  \"suicidal ideation\"  HISTORY OF PRESENT ILLNESS/INTERVAL HISTORY:  (reviewed/updated 1/17/2020). per initial evaluation: The patient, Rod Carlos, is a 63 y.o. BLACK OR  female with a past psychiatric history significant for cocaine use disorder, who presents at this time with complaints of (and/or evidence of) the following emotional symptoms: depression and suicide attempt Overdose of: unknown medication approximately 2 days ago. Additional symptomatology include irritability. The above symptoms have been present for 2+ weeks. These symptoms are of moderate to high severity. These symptoms are constant in nature. The patient's condition has been precipitated by psychosocial stressors. Patient's condition made worse by continued illicit drug use and alcohol use as well as treatment noncompliance. UDS: +cocaine; BAL=0.      The patient is a fair historian. The patient corroborates the above narrative. The patient contracts for safety on the unit and gives consent for the team to contact collateral. The patient is amenable to initiating treatment while on the unit. The patient reports that she has been doing poorly for over a year, due to various external stressors (unemployment, disagreements with her support sister [sister], loss of power and water due to non-payment of utility bills) and decided to end her life. She states she did not specifically use cocaine recently, but can recall using marijuana within a week of admission. She reports not taking medication for HTN and HLD despite a history of stroke. 1/15 - no acute overnight events.  Patient allowed blood draw, and was started on Remeron, which she is tolerating. She is visible, no episodes of self harm but remains depressed; she did not voice active thoughts of self harm this morning. HTN and CAD started agents by medical consult. Patient with mild HLD but elevated LDL, otherwise labwork WNL. Patient counseled on compliance, working with SW to secure a stable discharge plan. 1/16 - patient voiced HI toward her sister and son, but presently denies intent. Patient with ongoing SI, endorses hopelessness, but denies active thoughts of self harm. Per staff, patient stated she made some bizarre statements about \"moving the world\" but does not appear internally preoccupied. Patient states she can't sleep, got trazodone without much effect. Counseled on sleep hygiene. Patient states she would like to her have her oldest sister involved in her care, requesting family meeting. 1/17 - patient isolative to room, slept 5.5 hours after getting Trazodone and Zyprexa. She reports feeling very sedated this morning and requests not getting Zyprexa anymore. Patient denies active thoughts of self harm, but acknowledges hopelessness. Sister contacted for family meeting, awaiting response. SIDE EFFECTS: (reviewed/updated 1/17/2020)  +headache     ALLERGIES:(reviewed/updated 1/17/2020)  No Known Allergies   MEDICATIONS PRIOR TO ADMISSION:(reviewed/updated 1/17/2020)  No medications prior to admission. PAST MEDICAL HISTORY: Past medical history from the initial psychiatric evaluation has been reviewed (reviewed/updated 1/17/2020) with no additional updates (I asked patient and no additional past medical history provided). Past Medical History:   Diagnosis Date    Hypertension     Stroke [facility]) 2015   History reviewed. No pertinent surgical history.    SOCIAL HISTORY: Social history from the initial psychiatric evaluation has been reviewed (reviewed/updated 1/17/2020) with no additional updates (I asked patient and no additional social history provided). Social History     Socioeconomic History    Marital status: SINGLE     Spouse name: Not on file    Number of children: Not on file    Years of education: Not on file    Highest education level: Not on file   Occupational History    Not on file   Social Needs    Financial resource strain: Not on file    Food insecurity:     Worry: Not on file     Inability: Not on file    Transportation needs:     Medical: Not on file     Non-medical: Not on file   Tobacco Use    Smoking status: Current Every Day Smoker    Smokeless tobacco: Never Used   Substance and Sexual Activity    Alcohol use: Yes    Drug use: Yes     Types: Marijuana    Sexual activity: Not on file   Lifestyle    Physical activity:     Days per week: Not on file     Minutes per session: Not on file    Stress: Not on file   Relationships    Social connections:     Talks on phone: Not on file     Gets together: Not on file     Attends Oriental orthodox service: Not on file     Active member of club or organization: Not on file     Attends meetings of clubs or organizations: Not on file     Relationship status: Not on file    Intimate partner violence:     Fear of current or ex partner: Not on file     Emotionally abused: Not on file     Physically abused: Not on file     Forced sexual activity: Not on file   Other Topics Concern    Not on file   Social History Narrative    Not on file      FAMILY HISTORY: Family history from the initial psychiatric evaluation has been reviewed (reviewed/updated 1/17/2020) with no additional updates (I asked patient and no additional family history provided). History reviewed. No pertinent family history.     REVIEW OF SYSTEMS: (reviewed/updated 1/17/2020)  Appetite:no change from normal   Sleep: poor with DIMS (difficulty initiating & maintaining sleep)   All other Review of Systems: Negative except per HPI         [address] [facility] ([facility]):    [facility] FINDINGS ARE WITHIN NORMAL LIMITS (WNL) UNLESS OTHERWISE STATED BELOW. ( ALL OF THE BELOW CATEGORIES OF THE MSE HAVE BEEN REVIEWED (reviewed 1/17/2020) AND UPDATED AS DEEMED APPROPRIATE )  General Presentation age appropriate, cooperative and guarded   Orientation oriented to time, place and person   Vital Signs  See below (reviewed 1/17/2020); Vital Signs (BP, Pulse, & Temp) are within normal limits if not listed below. Gait and Station Stable/steady, no ataxia   Musculoskeletal System No extrapyramidal symptoms (EPS); no abnormal muscular movements or Tardive Dyskinesia (TD); muscle strength and tone are within normal limits   Language No aphasia or dysarthria   Speech:  normal volume and non-pressured   Thought Processes logical; normal rate of thoughts; fair abstract reasoning/computation   Thought Associations goal directed   Thought Content preoccupations   Suicidal Ideations contracts for safety   Homicidal Ideations none   Mood:  depressed and anhedonia   Affect:  mood-congruent   Memory recent  intact   Memory remote:  intact   Concentration/Attention:  intact   Fund of Knowledge average   Insight:  limited   Reliability fair   Judgment:  improving          VITALS:     Patient Vitals for the past 24 hrs:   Temp Pulse Resp BP SpO2   01/17/20 0754 98.4 °F (36.9 °C) (!) 101 18 (!) 121/95 100 %   01/16/20 1954 98.4 °F (36.9 °C) 80 16 132/90 98 %   01/16/20 1953  80  132/90      Wt Readings from Last 3 Encounters:   01/13/20 98 kg (216 lb)     Temp Readings from Last 3 Encounters:   01/17/20 98.4 °F (36.9 °C)     BP Readings from Last 3 Encounters:   01/17/20 (!) 121/95     Pulse Readings from Last 3 Encounters:   01/17/20 (!) 101            DATA     LABORATORY DATA:(reviewed/updated 1/17/2020)  No results found for this or any previous visit (from the past 24 hour(s)).   No results found for: VALF2, VALAC, VALP, VALPR, DS6, CRBAM, CRBAMP, CARB2, XCRBAM  No results found for: [address] [place] [address] [address] REPORTS:(reviewed/updated 1/17/2020)  No results found.        MEDICATIONS     ALL MEDICATIONS:   Current Facility-Administered Medications   Medication Dose Route Frequency    nicotine (NICORETTE) gum 2 mg  2 mg Oral Q1H PRN    traZODone (DESYREL) tablet 100 mg  100 mg Oral QHS PRN    amLODIPine (NORVASC) tablet 10 mg  10 mg Oral DAILY    atorvastatin (LIPITOR) tablet 40 mg  40 mg Oral QHS    aspirin delayed-release tablet 81 mg  81 mg Oral DAILY    mirtazapine (REMERON) tablet 15 mg  15 mg Oral QHS    OLANZapine (ZyPREXA) tablet 5 mg  5 mg Oral Q6H PRN    haloperidol lactate (HALDOL) injection 5 mg  5 mg IntraMUSCular Q6H PRN    benztropine (COGENTIN) tablet 1 mg  1 mg Oral BID PRN    diphenhydrAMINE (BENADRYL) injection 50 mg  50 mg IntraMUSCular BID PRN    hydrOXYzine HCl (ATARAX) tablet 50 mg  50 mg Oral TID PRN    LORazepam (ATIVAN) injection 1 mg  1 mg IntraMUSCular Q4H PRN    acetaminophen (TYLENOL) tablet 650 mg  650 mg Oral Q4H PRN    magnesium hydroxide (MILK OF MAGNESIA) 400 mg/5 mL oral suspension 30 mL  30 mL Oral DAILY PRN    cloNIDine HCl (CATAPRES) tablet 0.1 mg  0.1 mg Oral Q6H PRN      SCHEDULED MEDICATIONS:   Current Facility-Administered Medications   Medication Dose Route Frequency    amLODIPine (NORVASC) tablet 10 mg  10 mg Oral DAILY    atorvastatin (LIPITOR) tablet 40 mg  40 mg Oral QHS    aspirin delayed-release tablet 81 mg  81 mg Oral DAILY    mirtazapine (REMERON) tablet 15 mg  15 mg Oral QHS          ASSESSMENT & PLAN     DIAGNOSES REQUIRING ACTIVE TREATMENT AND MONITORING: (reviewed/updated 1/17/2020)  Patient Active Hospital Problem List:   Major depressive disorder, recurrent episode with melancholic features ([facility] (5/62/2445)   Major depressive disorder, recurrent episode with melancholic features ([facility] (1/97/9739)    Assessment: patient with significant situational stressors, poor motivation, hopelessness and frustration as well as a recent history of stimulant use. Suspect combination of underlying depression, demoralization and withdrawal dysphoria. Will optimize medically, treat underlying medical comorbidity and start antidepressant. Plan:  - CONTINUE Remeron 15 mg QHS for MDD, will titrate on HD6  - Appreciate medicine recs  - IGM therapy as tolerated  - Expand database / obtain collateral  - Dispo planning     In summary, Maddi Byers, is a 63 y.o.  female who presents with a severe exacerbation of the principal diagnosis of Major depressive disorder, recurrent episode with melancholic features ([facility]    Patient's condition is improving. Patient requires continued inpatient hospitalization for further stabilization, safety monitoring and medication management. I will continue to coordinate the provision of individual, milieu, occupational, group, and substance abuse therapies to address target symptoms/diagnoses as deemed appropriate for the individual patient. A coordinated, multidisplinary treatment team round was conducted with the patient (this team consists of the nurse, psychiatric unit pharmcist,  and writer). Complete current electronic health record for patient has been reviewed today including consultant notes, ancillary staff notes, nurses and psychiatric tech notes. Suicide risk assessment completed and patient deemed to be of high risk for suicide at this time. The following regarding medications was addressed during rounds with patient:   the risks and benefits of the proposed medication. The patient was given the opportunity to ask questions. Informed consent given to the use of the above medications. Will continue to adjust psychiatric and non-psychiatric medications (see above \"medication\" section and orders section for details) as deemed appropriate & based upon diagnoses and response to treatment.      I will continue to order blood tests/labs and diagnostic tests as deemed appropriate and review results as they become available (see orders for details and above listed lab/test results). I will order psychiatric records from previous [facility] hospitals to further elucidate the nature of patient's psychopathology and review once available. I will gather additional collateral information from friends, family and o/p treatment team to further elucidate the nature of patient's psychopathology and baselline level of psychiatric functioning. I certify that this patient's inpatient psychiatric hospital services furnished since the previous certification were, and continue to be, required for treatment that could reasonably be expected to improve the patient's condition, or for diagnostic study, and that the patient continues to need, on a daily basis, active treatment furnished directly by or requiring the supervision of inpatient psychiatric facility personnel. In addition, the hospital records show that services furnished were intensive treatment services, admission or related services, or equivalent services.     EXPECTED DISCHARGE DATE/DAY: 1/20/2020     DISPOSITION: Home       Signed By:   Mendel Rivers, MD  1/17/2020

## 2020-01-18 PROCEDURE — 65220000003 HC RM SEMIPRIVATE PSYCH

## 2020-01-18 PROCEDURE — 74011250637 HC RX REV CODE- 250/637: Performed by: FAMILY MEDICINE

## 2020-01-18 PROCEDURE — 74011250637 HC RX REV CODE- 250/637: Performed by: NURSE PRACTITIONER

## 2020-01-18 PROCEDURE — 74011250637 HC RX REV CODE- 250/637: Performed by: PSYCHIATRY & NEUROLOGY

## 2020-01-18 PROCEDURE — 74011250637 HC RX REV CODE- 250/637: Performed by: STUDENT IN AN ORGANIZED HEALTH CARE EDUCATION/TRAINING PROGRAM

## 2020-01-18 RX ORDER — IBUPROFEN 600 MG/1
600 TABLET ORAL
Status: DISCONTINUED | OUTPATIENT
Start: 2020-01-18 | End: 2020-01-21 | Stop reason: HOSPADM

## 2020-01-18 RX ADMIN — Medication 81 MG: at 09:19

## 2020-01-18 RX ADMIN — ATORVASTATIN CALCIUM 40 MG: 40 TABLET, FILM COATED ORAL at 21:33

## 2020-01-18 RX ADMIN — IBUPROFEN 600 MG: 600 TABLET, FILM COATED ORAL at 12:13

## 2020-01-18 RX ADMIN — NICOTINE POLACRILEX 2 MG: 2 GUM, CHEWING BUCCAL at 18:05

## 2020-01-18 RX ADMIN — NICOTINE POLACRILEX 2 MG: 2 GUM, CHEWING BUCCAL at 11:29

## 2020-01-18 RX ADMIN — AMLODIPINE BESYLATE 10 MG: 5 TABLET ORAL at 09:19

## 2020-01-18 RX ADMIN — MIRTAZAPINE 15 MG: 15 TABLET, FILM COATED ORAL at 21:33

## 2020-01-18 RX ADMIN — TRAZODONE HYDROCHLORIDE 150 MG: 100 TABLET ORAL at 21:33

## 2020-01-18 RX ADMIN — NICOTINE POLACRILEX 2 MG: 2 GUM, CHEWING BUCCAL at 21:33

## 2020-01-18 NOTE — PROGRESS NOTES
Problem: Hypertension  Goal: *Blood pressure within specified parameters  Outcome: Progressing Towards Goal     Problem: Suicide  Goal: *STG: Remains safe in hospital  Outcome: Progressing Towards Goal     Problem: Suicide  Goal: *STG:  Verbalizes alternative ways of dealing with maladaptive feelings/behaviors  Outcome: Progressing Towards Goal     Problem: Suicide  Goal: *STG: Attends activities and groups  Outcome: Progressing Towards Goal

## 2020-01-18 NOTE — BH NOTES
0700 Received report from off going shift    0805 Assumed care of patient walking around in room. Mood-agitated. States she did not sleep well because the mattress was too hard. Denies SI. Positive for HI/AVH. Patient states she had people at this time she wanted to kill. 1130 Nicorette gum administered at patient's request    1200 Mattress exchanged for a softer one to aide in the patient sleep    1213 Motrin 600mg given for complaints of pain all over     1330 Resting quietly in bed. No further complaints of pain voiced. 1800 Patient has mood has calmed throughout the day. Smiling and interactive in the dayroom with other patients. Participated in group today.  Consumed 100% of all 3 meals/

## 2020-01-18 NOTE — BH NOTES
PSYCHIATRIC PROGRESS NOTE         Patient Name  Manjinder Ayala   Date of Birth 1960   [facility] [id number]   Medical Record Number  [id number]      Age  63 y.o. PCP None   Admit date:  1/13/2020    Room Number  329/01  @ [facility]   Date of Service  1/18/2020         E & M PROGRESS NOTE:         HISTORY       CC:  \"suicidal ideation\"  HISTORY OF PRESENT ILLNESS/INTERVAL HISTORY:  (reviewed/updated 1/18/2020). per initial evaluation: The patient, Manjinder Ayala, is a 63 y.o. BLACK OR  female with a past psychiatric history significant for cocaine use disorder, who presents at this time with complaints of (and/or evidence of) the following emotional symptoms: depression and suicide attempt Overdose of: unknown medication approximately 2 days ago. Additional symptomatology include irritability. The above symptoms have been present for 2+ weeks. These symptoms are of moderate to high severity. These symptoms are constant in nature. The patient's condition has been precipitated by psychosocial stressors. Patient's condition made worse by continued illicit drug use and alcohol use as well as treatment noncompliance. UDS: +cocaine; BAL=0.      The patient is a fair historian. The patient corroborates the above narrative. The patient contracts for safety on the unit and gives consent for the team to contact collateral. The patient is amenable to initiating treatment while on the unit. The patient reports that she has been doing poorly for over a year, due to various external stressors (unemployment, disagreements with her support sister [sister], loss of power and water due to non-payment of utility bills) and decided to end her life. She states she did not specifically use cocaine recently, but can recall using marijuana within a week of admission. She reports not taking medication for HTN and HLD despite a history of stroke. 1/15 - no acute overnight events.  Patient allowed blood draw, and was started on Remeron, which she is tolerating. She is visible, no episodes of self harm but remains depressed; she did not voice active thoughts of self harm this morning. HTN and CAD started agents by medical consult. Patient with mild HLD but elevated LDL, otherwise labwork WNL. Patient counseled on compliance, working with SW to secure a stable discharge plan. 1/16 - patient voiced HI toward her sister and son, but presently denies intent. Patient with ongoing SI, endorses hopelessness, but denies active thoughts of self harm. Per staff, patient stated she made some bizarre statements about \"moving the world\" but does not appear internally preoccupied. Patient states she can't sleep, got trazodone without much effect. Counseled on sleep hygiene. Patient states she would like to her have her oldest sister involved in her care, requesting family meeting. 1/17 - patient isolative to room, slept 5.5 hours after getting Trazodone and Zyprexa. She reports feeling very sedated this morning and requests not getting Zyprexa anymore. Patient denies active thoughts of self harm, but acknowledges hopelessness. Sister contacted for family meeting, awaiting response. 1/18 (weekend coverage) - Debbie Oliver reports that she gets frustrated quickly. She has several complaints on the unit. She is tolerating her medications. She did not sleep well last night. She is visible on the unit and interacts some with peers. SIDE EFFECTS: (reviewed/updated 1/18/2020)  +headache     ALLERGIES:(reviewed/updated 1/18/2020)  No Known Allergies   MEDICATIONS PRIOR TO ADMISSION:(reviewed/updated 1/18/2020)  No medications prior to admission. PAST MEDICAL HISTORY: Past medical history from the initial psychiatric evaluation has been reviewed (reviewed/updated 1/18/2020) with no additional updates (I asked patient and no additional past medical history provided).    Past Medical History:   Diagnosis Date    Hypertension  Stroke [facility]) 2015   History reviewed. No pertinent surgical history. SOCIAL HISTORY: Social history from the initial psychiatric evaluation has been reviewed (reviewed/updated 1/18/2020) with no additional updates (I asked patient and no additional social history provided). Social History     Socioeconomic History    Marital status: SINGLE     Spouse name: Not on file    Number of children: Not on file    Years of education: Not on file    Highest education level: Not on file   Occupational History    Not on file   Social Needs    Financial resource strain: Not on file    Food insecurity:     Worry: Not on file     Inability: Not on file    Transportation needs:     Medical: Not on file     Non-medical: Not on file   Tobacco Use    Smoking status: Current Every Day Smoker    Smokeless tobacco: Never Used   Substance and Sexual Activity    Alcohol use: Yes    Drug use: Yes     Types: Marijuana    Sexual activity: Not on file   Lifestyle    Physical activity:     Days per week: Not on file     Minutes per session: Not on file    Stress: Not on file   Relationships    Social connections:     Talks on phone: Not on file     Gets together: Not on file     Attends [employer] service: Not on file     Active member of club or organization: Not on file     Attends meetings of clubs or organizations: Not on file     Relationship status: Not on file    Intimate partner violence:     Fear of current or ex partner: Not on file     Emotionally abused: Not on file     Physically abused: Not on file     Forced sexual activity: Not on file   Other Topics Concern    Not on file   Social History Narrative    Not on file      FAMILY HISTORY: Family history from the initial psychiatric evaluation has been reviewed (reviewed/updated 1/18/2020) with no additional updates (I asked patient and no additional family history provided). History reviewed. No pertinent family history.     REVIEW OF SYSTEMS: (reviewed/updated 1/18/2020)  Appetite:no change from normal   Sleep: poor with DIMS (difficulty initiating & maintaining sleep)   All other Review of Systems: Negative except per HPI         [address] (MSE):    MSE FINDINGS ARE WITHIN NORMAL LIMITS (WNL) UNLESS OTHERWISE STATED BELOW. ( ALL OF THE BELOW CATEGORIES OF THE MSE HAVE BEEN REVIEWED (reviewed 1/18/2020) AND UPDATED AS DEEMED APPROPRIATE )  General Presentation age appropriate, cooperative and guarded   Orientation oriented to time, place and person   Vital Signs  See below (reviewed 1/18/2020); Vital Signs (BP, Pulse, & Temp) are within normal limits if not listed below.    Gait and Station Stable/steady, no ataxia   Musculoskeletal System No extrapyramidal symptoms (EPS); no abnormal muscular movements or Tardive Dyskinesia (TD); muscle strength and tone are within normal limits   Language No aphasia or dysarthria   Speech:  normal volume and non-pressured   Thought Processes logical; normal rate of thoughts; fair abstract reasoning/computation   Thought Associations goal directed   Thought Content preoccupations   Suicidal Ideations contracts for safety   Homicidal Ideations none   Mood:  depressed and anhedonia   Affect:  mood-congruent   Memory recent  intact   Memory remote:  intact   Concentration/Attention:  intact   Fund of Knowledge average   Insight:  limited   Reliability fair   Judgment:  improving          VITALS:     Patient Vitals for the past 24 hrs:   Temp Pulse Resp BP SpO2   01/18/20 0746 97.5 °F (36.4 °C) 78 18 (!) 132/96 98 %   01/17/20 2008 98.2 °F (36.8 °C) 86 18 118/81 97 %     Wt Readings from Last 3 Encounters:   01/13/20 98 kg (216 lb)     Temp Readings from Last 3 Encounters:   01/18/20 97.5 °F (36.4 °C)     BP Readings from Last 3 Encounters:   01/18/20 (!) 132/96     Pulse Readings from Last 3 Encounters:   01/18/20 78            DATA     LABORATORY DATA:(reviewed/updated 1/18/2020)  No results found for this or any previous visit (from the past 24 hour(s)). No results found for: VALF2, VALAC, VALP, VALPR, DS6, CRBAM, CRBAMP, CARB2, XCRBAM  No results found for: LITHM   RADIOLOGY REPORTS:(reviewed/updated 1/18/2020)  No results found.        MEDICATIONS     ALL MEDICATIONS:   Current Facility-Administered Medications   Medication Dose Route Frequency    ibuprofen (MOTRIN) tablet 600 mg  600 mg Oral Q6H PRN    doxepin (SINEquan) capsule 10 mg  10 mg Oral QHS PRN    traZODone (DESYREL) tablet 150 mg  150 mg Oral QHS PRN    nicotine (NICORETTE) gum 2 mg  2 mg Oral Q1H PRN    amLODIPine (NORVASC) tablet 10 mg  10 mg Oral DAILY    atorvastatin (LIPITOR) tablet 40 mg  40 mg Oral QHS    aspirin delayed-release tablet 81 mg  81 mg Oral DAILY    mirtazapine (REMERON) tablet 15 mg  15 mg Oral QHS    haloperidol lactate (HALDOL) injection 5 mg  5 mg IntraMUSCular Q6H PRN    benztropine (COGENTIN) tablet 1 mg  1 mg Oral BID PRN    diphenhydrAMINE (BENADRYL) injection 50 mg  50 mg IntraMUSCular BID PRN    hydrOXYzine HCl (ATARAX) tablet 50 mg  50 mg Oral TID PRN    LORazepam (ATIVAN) injection 1 mg  1 mg IntraMUSCular Q4H PRN    magnesium hydroxide (MILK OF MAGNESIA) 400 mg/5 mL oral suspension 30 mL  30 mL Oral DAILY PRN    cloNIDine HCl (CATAPRES) tablet 0.1 mg  0.1 mg Oral Q6H PRN      SCHEDULED MEDICATIONS:   Current Facility-Administered Medications   Medication Dose Route Frequency    amLODIPine (NORVASC) tablet 10 mg  10 mg Oral DAILY    atorvastatin (LIPITOR) tablet 40 mg  40 mg Oral QHS    aspirin delayed-release tablet 81 mg  81 mg Oral DAILY    mirtazapine (REMERON) tablet 15 mg  15 mg Oral QHS          ASSESSMENT & PLAN     DIAGNOSES REQUIRING ACTIVE TREATMENT AND MONITORING: (reviewed/updated 1/18/2020)  Patient Active Hospital Problem List:   Major depressive disorder, recurrent episode with melancholic features ([facility] (7/97/0364)   Major depressive disorder, recurrent episode with melancholic features ([facility] (0/68/4107)    Assessment: patient with significant situational stressors, poor motivation, hopelessness and frustration as well as a recent history of stimulant use. Suspect combination of underlying depression, demoralization and withdrawal dysphoria. Will optimize medically, treat underlying medical comorbidity and start antidepressant. Plan:  - CONTINUE Remeron 15 mg QHS for MDD, will titrate on HD6  - Appreciate medicine recs  - IGM therapy as tolerated  - Expand database / obtain collateral  - Dispo planning     In summary, Karen King, is a 63 y.o.  female who presents with a severe exacerbation of the principal diagnosis of Major depressive disorder, recurrent episode with melancholic features ([facility]    Patient's condition is improving. Patient requires continued inpatient hospitalization for further stabilization, safety monitoring and medication management. I will continue to coordinate the provision of individual, milieu, occupational, group, and substance abuse therapies to address target symptoms/diagnoses as deemed appropriate for the individual patient. A coordinated, multidisplinary treatment team round was conducted with the patient (this team consists of the nurse, psychiatric unit pharmcist,  and writer). Complete current electronic health record for patient has been reviewed today including consultant notes, ancillary staff notes, nurses and psychiatric tech notes. Suicide risk assessment completed and patient deemed to be of high risk for suicide at this time. The following regarding medications was addressed during rounds with patient:   the risks and benefits of the proposed medication. The patient was given the opportunity to ask questions. Informed consent given to the use of the above medications.  Will continue to adjust psychiatric and non-psychiatric medications (see above \"medication\" section and orders section for details) as deemed appropriate & based upon diagnoses and response to treatment. I will continue to order blood tests/labs and diagnostic tests as deemed appropriate and review results as they become available (see orders for details and above listed lab/test results). I will order psychiatric records from previous [facility] hospitals to further elucidate the nature of patient's psychopathology and review once available. I will gather additional collateral information from friends, family and o/p treatment team to further elucidate the nature of patient's psychopathology and baselline level of psychiatric functioning. I certify that this patient's inpatient psychiatric hospital services furnished since the previous certification were, and continue to be, required for treatment that could reasonably be expected to improve the patient's condition, or for diagnostic study, and that the patient continues to need, on a daily basis, active treatment furnished directly by or requiring the supervision of inpatient psychiatric facility personnel. In addition, the hospital records show that services furnished were intensive treatment services, admission or related services, or equivalent services.     EXPECTED DISCHARGE DATE/DAY: 1/20/2020     DISPOSITION: Home       Signed By:   Mer Salazar NP  1/18/2020

## 2020-01-18 NOTE — PROGRESS NOTES
Problem: Hypertension  Goal: *Fluid volume balance  Outcome: Progressing Towards Goal     Problem: Suicide  Goal: *STG: Remains safe in hospital  Outcome: Progressing Towards Goal  Goal: *STG: Attends activities and groups  Outcome: Progressing Towards Goal

## 2020-01-18 NOTE — BH NOTES
Behavioral Health Interdisciplinary Rounds Patient Name: Lizzie Osei  Age: 63 y.o. Room/Bed:  329/01 Primary Diagnosis: Major depressive disorder, recurrent episode [facility] Admission Status: Voluntary Readmission within 30 days: no 
Power of  in place: Unknown Patient requires a blocked bed: no           
Reason for blocked bed: not required at this time Order for blocked bed obtained: no    
 
Sleep hours: 7.5 Participation in Care/Groups:  yes Medication Compliant?: Yes PRNS (last 24 hours): Sleep Aid Restraints (last 24 hours):  no 
Substance Abuse:  yes 24 hour chart check complete: yes

## 2020-01-18 NOTE — PROGRESS NOTES
Diet as tolerated. Suggest consistent carbohydrate diet trays. Extra protein and vegetables ordered. Pt noted to be meal compliant. Hx notable for substance abuse, HTN, hyperlipidemia, CVA, pre-diabetes.     Ht: 5'8\"  Wt: 216 lb  BMI: 32.84 kg/(m^2) c/w obesity grade I  Est energy needs: 1945 kcal, 70 g protein, 2000 mL fluids  Pt will consume> 75% of meals at follow up 7-10 days  LOS

## 2020-01-18 NOTE — BH NOTES
1900  Assumed care of patient from [facility], [address] [facility] patient found to be in the day room, social with peers. Quite pleasant and cooperative this evening. Denies the presence of auditory or visual hallucinations. Does continue to voice suicidal and homicidal ideation but states that \"is pretty much normal for me\". States she does not feel as if she would act on either of those feelings. Attended groups today. Stated she no longer wishes to be given zyprexa as it caused her to be extremely lethargic. No signs of responding to any type of internal stimuli. No behaviors noted. 2123  Patient compliant with evening medications. Also requested PRN Trazodone for sleep and Nicorette gum    2330  Patient resting quietly in bed at this time. Respirations are easy and non labored. Patient does appear to be sleeping. Will continue to monitor per patient plan of care    5804  Patient continues to rest quietly at this time. Appears to be sleeping    0337  Patient is resting quietly at this time. Respirations remain even and non labored. Will continue to monitor    0530  Patient slept well throughout the night.     0630  Patient slept approximately 7.5  hours

## 2020-01-18 NOTE — BH NOTES
700-Report received from off going nurse, assumed care of patient    0800-Patient assessment complete, currently denies SI/HI, AVH, confirms feelings of depression and anxiety. In the dayroom     0900-Breakfast in the dayroom interacted well with peers. There are no current complaints of pain. Will continue to monitor for changes in condition. 1000-Reports decreased feeling of anxiety    1100-There are no noted issues at this time. Will continue to monitor for changes    1200-Patient ate lunch in dayroom, completed 100% of meal, seen interacting well with peers. Patient acitively asking other patients for their food. Voiced that she plans to attend group. 8511-3399- Patient reading in room during down time. Patient mood is appropriate to situation Communication is loud. No problems or concerns shared with staff. Patient monitored for safety and offered support when needed. Patient attended group.

## 2020-01-19 PROCEDURE — 74011250637 HC RX REV CODE- 250/637: Performed by: PSYCHIATRY & NEUROLOGY

## 2020-01-19 PROCEDURE — 74011250637 HC RX REV CODE- 250/637: Performed by: STUDENT IN AN ORGANIZED HEALTH CARE EDUCATION/TRAINING PROGRAM

## 2020-01-19 PROCEDURE — 74011250637 HC RX REV CODE- 250/637: Performed by: FAMILY MEDICINE

## 2020-01-19 PROCEDURE — 74011250637 HC RX REV CODE- 250/637: Performed by: NURSE PRACTITIONER

## 2020-01-19 PROCEDURE — 65220000003 HC RM SEMIPRIVATE PSYCH

## 2020-01-19 RX ADMIN — MIRTAZAPINE 15 MG: 15 TABLET, FILM COATED ORAL at 21:42

## 2020-01-19 RX ADMIN — TRAZODONE HYDROCHLORIDE 150 MG: 100 TABLET ORAL at 21:42

## 2020-01-19 RX ADMIN — HYDROXYZINE HYDROCHLORIDE 50 MG: 25 TABLET, FILM COATED ORAL at 21:42

## 2020-01-19 RX ADMIN — Medication 81 MG: at 09:06

## 2020-01-19 RX ADMIN — ATORVASTATIN CALCIUM 40 MG: 40 TABLET, FILM COATED ORAL at 21:42

## 2020-01-19 RX ADMIN — AMLODIPINE BESYLATE 10 MG: 5 TABLET ORAL at 09:06

## 2020-01-19 RX ADMIN — DOXEPIN HYDROCHLORIDE 10 MG: 10 CAPSULE ORAL at 00:10

## 2020-01-19 NOTE — PROGRESS NOTES
Problem: Suicide  Goal: *STG: Remains safe in hospital  Outcome: Progressing Towards Goal     Problem: Suicide  Goal: *STG: Attends activities and groups  Outcome: Progressing Towards Goal     Problem: Depressed Mood (Adult/Pediatric)  Goal: *STG: Participates in treatment plan  Outcome: Progressing Towards Goal     Problem: Depressed Mood (Adult/Pediatric)  Goal: *STG: Participates in 1:1 therapy sessions  Outcome: Progressing Towards Goal

## 2020-01-19 NOTE — PROGRESS NOTES
Problem: Suicide  Goal: *STG/LTG: No longer expresses self destructive or suicidal thoughts  Outcome: Progressing Towards Goal

## 2020-01-19 NOTE — BH NOTES
Behavioral Health Interdisciplinary Rounds     Patient Name: Alejandro Lafleur  Age: 63 y.o.   Room/Bed:  329/01  Primary Diagnosis: Major depressive disorder, recurrent episode with melancholic features ([facility]   Admission Status: Voluntary     Readmission within 30 days: no  Power of  in place: [address]  Patient requires a blocked bed: no            Reason for blocked bed: Not required at this time  Order for blocked bed obtained: no       Sleep hours: 6.5        Participation in Care/Groups:  yes  Medication Compliant?: Yes  PRNS (last 24 hours): Sleep Aid    Restraints (last 24 hours):  no  Substance Abuse:  yes    24 hour chart check complete: yes

## 2020-01-19 NOTE — BH NOTES
Patient alert and verbal. Denies SI. Endorses HI towards sister. Endorses depression and mild anxiety. Medication and meal compliant. Visible on unit. Interacts well with peers. Q 15 minute checks continue for safety.

## 2020-01-20 PROCEDURE — 74011250637 HC RX REV CODE- 250/637: Performed by: PSYCHIATRY & NEUROLOGY

## 2020-01-20 PROCEDURE — 74011250637 HC RX REV CODE- 250/637: Performed by: NURSE PRACTITIONER

## 2020-01-20 PROCEDURE — 65220000003 HC RM SEMIPRIVATE PSYCH

## 2020-01-20 PROCEDURE — 74011250637 HC RX REV CODE- 250/637: Performed by: STUDENT IN AN ORGANIZED HEALTH CARE EDUCATION/TRAINING PROGRAM

## 2020-01-20 PROCEDURE — 74011250637 HC RX REV CODE- 250/637: Performed by: FAMILY MEDICINE

## 2020-01-20 RX ORDER — AMLODIPINE BESYLATE 10 MG/1
10 TABLET ORAL DAILY
Qty: 30 TAB | Refills: 0 | Status: SHIPPED | OUTPATIENT
Start: 2020-01-21

## 2020-01-20 RX ORDER — ATORVASTATIN CALCIUM 40 MG/1
40 TABLET, FILM COATED ORAL
Qty: 30 TAB | Refills: 0 | Status: SHIPPED | OUTPATIENT
Start: 2020-01-20

## 2020-01-20 RX ORDER — MIRTAZAPINE 15 MG/1
15 TABLET, FILM COATED ORAL
Qty: 30 TAB | Refills: 0 | Status: SHIPPED | OUTPATIENT
Start: 2020-01-20

## 2020-01-20 RX ORDER — ASPIRIN 81 MG/1
81 TABLET ORAL DAILY
Qty: 30 TAB | Refills: 0 | Status: SHIPPED | OUTPATIENT
Start: 2020-01-21

## 2020-01-20 RX ADMIN — ATORVASTATIN CALCIUM 40 MG: 40 TABLET, FILM COATED ORAL at 21:39

## 2020-01-20 RX ADMIN — TRAZODONE HYDROCHLORIDE 150 MG: 100 TABLET ORAL at 21:41

## 2020-01-20 RX ADMIN — NICOTINE POLACRILEX 2 MG: 2 GUM, CHEWING BUCCAL at 21:45

## 2020-01-20 RX ADMIN — AMLODIPINE BESYLATE 10 MG: 5 TABLET ORAL at 08:52

## 2020-01-20 RX ADMIN — HYDROXYZINE HYDROCHLORIDE 50 MG: 25 TABLET, FILM COATED ORAL at 21:41

## 2020-01-20 RX ADMIN — Medication 81 MG: at 08:52

## 2020-01-20 RX ADMIN — MIRTAZAPINE 15 MG: 15 TABLET, FILM COATED ORAL at 21:39

## 2020-01-20 NOTE — PROGRESS NOTES
Problem: Depressed Mood (Adult/Pediatric)  Goal: *STG: Verbalizes anger, guilt, and other feelings in a constructive manor  Outcome: Progressing Towards Goal     Problem: Depressed Mood (Adult/Pediatric)  Goal: *STG: Attends activities and groups  Outcome: Progressing Towards Goal     Problem: Depressed Mood (Adult/Pediatric)  Goal: *STG: Demonstrates reduction in symptoms and increase in insight into coping skills/future focused  Outcome: Not Progressing Towards Goal     Problem: Depressed Mood (Adult/Pediatric)  Goal: *STG: Remains safe in hospital  Outcome: Progressing Towards Goal     Problem: Depressed Mood (Adult/Pediatric)  Goal: *STG: Complies with medication therapy  Outcome: Progressing Towards Goal  1910- I assume care of the patient. Patient is in the day room  talking to peers. Patient endorses HI towards sister and endorses being mildly depressed. Patient denies SI/AH./VH. Patient is talking to peers and watching TV.  2030- Patient is eating snacks and talking to peers in the day room. 2130- Patient is compliant with meds and took ATarax and Trazodone for anxiety and sleep. 2230- Patient is now going to bed. 2330- Patient is asleep in bed. 3238-2048- Patient is asleep in bed.  0200- 0500- Patient is asleep in bed.  0600- Patient slept approx. 7.5 hrs.

## 2020-01-20 NOTE — BH NOTES
Pt is pleasant and cooperative with staff and peers. Pt is med compliant and denies all psych symptoms. Pt attends meals and groups. No acute distress; continue to monitor and provide support when needed.

## 2020-01-20 NOTE — BH NOTES
PSYCHIATRIC PROGRESS NOTE         Patient Name  Keenan Combs   Date of Birth 1960   [facility] [id number]   Medical Record Number  [id number]      Age  63 y.o. PCP None   Admit date:  1/13/2020    Room Number  329/01  @ [facility]   Date of Service  1/20/2020         E & M PROGRESS NOTE:         HISTORY       CC:  \"suicidal ideation\"  HISTORY OF PRESENT ILLNESS/INTERVAL HISTORY:  (reviewed/updated 1/20/2020). per initial evaluation: The patient, Keenan Combs, is a 63 y.o. BLACK OR  female with a past psychiatric history significant for cocaine use disorder, who presents at this time with complaints of (and/or evidence of) the following emotional symptoms: depression and suicide attempt Overdose of: unknown medication approximately 2 days ago. Additional symptomatology include irritability. The above symptoms have been present for 2+ weeks. These symptoms are of moderate to high severity. These symptoms are constant in nature. The patient's condition has been precipitated by psychosocial stressors. Patient's condition made worse by continued illicit drug use and alcohol use as well as treatment noncompliance. UDS: +cocaine; BAL=0.      The patient is a fair historian. The patient corroborates the above narrative. The patient contracts for safety on the unit and gives consent for the team to contact collateral. The patient is amenable to initiating treatment while on the unit. The patient reports that she has been doing poorly for over a year, due to various external stressors (unemployment, disagreements with her support sister [sister], loss of power and water due to non-payment of utility bills) and decided to end her life. She states she did not specifically use cocaine recently, but can recall using marijuana within a week of admission. She reports not taking medication for HTN and HLD despite a history of stroke. 1/15 - no acute overnight events.  Patient allowed blood draw, and was started on Remeron, which she is tolerating. She is visible, no episodes of self harm but remains depressed; she did not voice active thoughts of self harm this morning. HTN and CAD started agents by medical consult. Patient with mild HLD but elevated LDL, otherwise labwork WNL. Patient counseled on compliance, working with SW to secure a stable discharge plan. 1/16 - patient voiced HI toward her sister and son, but presently denies intent. Patient with ongoing SI, endorses hopelessness, but denies active thoughts of self harm. Per staff, patient stated she made some bizarre statements about \"moving the world\" but does not appear internally preoccupied. Patient states she can't sleep, got trazodone without much effect. Counseled on sleep hygiene. Patient states she would like to her have her oldest sister involved in her care, requesting family meeting. 1/17 - patient isolative to room, slept 5.5 hours after getting Trazodone and Zyprexa. She reports feeling very sedated this morning and requests not getting Zyprexa anymore. Patient denies active thoughts of self harm, but acknowledges hopelessness. Sister contacted for family meeting, awaiting response. 1/20 - patient has been visible, slept 7 hours, compliant with Remeron. Patient has been in contact with her older sister, who's home she will be discharged to tomorrow AM. Patient without any specific concerns about her current medication regimen, but endorses side effects of dry mouth; patient encouraged to remain hydrated. She denies SI/AVH, but does endorse HI toward her family at times due to ongoing perception of no support. SIDE EFFECTS: (reviewed/updated 1/20/2020)  +headache     ALLERGIES:(reviewed/updated 1/20/2020)  No Known Allergies   MEDICATIONS PRIOR TO ADMISSION:(reviewed/updated 1/20/2020)  No medications prior to admission.       PAST MEDICAL HISTORY: Past medical history from the initial psychiatric evaluation has been reviewed (reviewed/updated 1/20/2020) with no additional updates (I asked patient and no additional past medical history provided). Past Medical History:   Diagnosis Date    Hypertension     Stroke [facility]) 2015   History reviewed. No pertinent surgical history. SOCIAL HISTORY: Social history from the initial psychiatric evaluation has been reviewed (reviewed/updated 1/20/2020) with no additional updates (I asked patient and no additional social history provided). Social History     Socioeconomic History    Marital status: SINGLE     Spouse name: Not on file    Number of children: Not on file    Years of education: Not on file    Highest education level: Not on file   Occupational History    Not on file   Social Needs    Financial resource strain: Not on file    Food insecurity:     Worry: Not on file     Inability: Not on file    Transportation needs:     Medical: Not on file     Non-medical: Not on file   Tobacco Use    Smoking status: Current Every Day Smoker    Smokeless tobacco: Never Used   Substance and Sexual Activity    Alcohol use:  Yes    Drug use: Yes     Types: Marijuana    Sexual activity: Not on file   Lifestyle    Physical activity:     Days per week: Not on file     Minutes per session: Not on file    Stress: Not on file   Relationships    Social connections:     Talks on phone: Not on file     Gets together: Not on file     Attends Restorationist service: Not on file     Active member of club or organization: Not on file     Attends meetings of clubs or organizations: Not on file     Relationship status: Not on file    Intimate partner violence:     Fear of current or ex partner: Not on file     Emotionally abused: Not on file     Physically abused: Not on file     Forced sexual activity: Not on file   Other Topics Concern    Not on file   Social History Narrative    Not on file      FAMILY HISTORY: Family history from the initial psychiatric evaluation has been reviewed (reviewed/updated 1/20/2020) with no additional updates (I asked patient and no additional family history provided). History reviewed. No pertinent family history. REVIEW OF SYSTEMS: (reviewed/updated 1/20/2020)  Appetite:no change from normal   Sleep: poor with DIMS (difficulty initiating & maintaining sleep)   All other Review of Systems: Negative except per HPI         [facility] (MSE):    MSE FINDINGS ARE WITHIN NORMAL LIMITS (WNL) UNLESS OTHERWISE STATED BELOW. ( ALL OF THE BELOW CATEGORIES OF THE MSE HAVE BEEN REVIEWED (reviewed 1/20/2020) AND UPDATED AS DEEMED APPROPRIATE )  General Presentation age appropriate, cooperative and guarded   Orientation oriented to time, place and person   Vital Signs  See below (reviewed 1/20/2020); Vital Signs (BP, Pulse, & Temp) are within normal limits if not listed below.    Gait and Station Stable/steady, no ataxia   Musculoskeletal System No extrapyramidal symptoms (EPS); no abnormal muscular movements or Tardive Dyskinesia (TD); muscle strength and tone are within normal limits   Language No aphasia or dysarthria   Speech:  normal volume and non-pressured   Thought Processes logical; normal rate of thoughts; fair abstract reasoning/computation   Thought Associations goal directed   Thought Content preoccupations   Suicidal Ideations contracts for safety   Homicidal Ideations none   Mood:  depressed and anhedonia   Affect:  mood-congruent   Memory recent  intact   Memory remote:  intact   Concentration/Attention:  intact   Fund of Knowledge average   Insight:  limited   Reliability fair   Judgment:  improving          VITALS:     Patient Vitals for the past 24 hrs:   Temp Pulse Resp BP SpO2   01/20/20 0905 98.1 °F (36.7 °C) 84 18 142/89 99 %   01/19/20 2020 98 °F (36.7 °C) 82 16 128/84 100 %     Wt Readings from Last 3 Encounters:   01/13/20 98 kg (216 lb)     Temp Readings from Last 3 Encounters:   01/20/20 98.1 °F (36.7 °C) BP Readings from Last 3 Encounters:   01/20/20 142/89     Pulse Readings from Last 3 Encounters:   01/20/20 84            DATA     LABORATORY DATA:(reviewed/updated 1/20/2020)  No results found for this or any previous visit (from the past 24 hour(s)). No results found for: VALF2, VALAC, VALP, VALPR, DS6, CRBAM, CRBAMP, CARB2, XCRBAM  No results found for: LITHM   RADIOLOGY REPORTS:(reviewed/updated 1/20/2020)  No results found.        MEDICATIONS     ALL MEDICATIONS:   Current Facility-Administered Medications   Medication Dose Route Frequency    ibuprofen (MOTRIN) tablet 600 mg  600 mg Oral Q6H PRN    doxepin (SINEquan) capsule 10 mg  10 mg Oral QHS PRN    traZODone (DESYREL) tablet 150 mg  150 mg Oral QHS PRN    nicotine (NICORETTE) gum 2 mg  2 mg Oral Q1H PRN    amLODIPine (NORVASC) tablet 10 mg  10 mg Oral DAILY    atorvastatin (LIPITOR) tablet 40 mg  40 mg Oral QHS    aspirin delayed-release tablet 81 mg  81 mg Oral DAILY    mirtazapine (REMERON) tablet 15 mg  15 mg Oral QHS    haloperidol lactate (HALDOL) injection 5 mg  5 mg IntraMUSCular Q6H PRN    benztropine (COGENTIN) tablet 1 mg  1 mg Oral BID PRN    diphenhydrAMINE (BENADRYL) injection 50 mg  50 mg IntraMUSCular BID PRN    hydrOXYzine HCl (ATARAX) tablet 50 mg  50 mg Oral TID PRN    LORazepam (ATIVAN) injection 1 mg  1 mg IntraMUSCular Q4H PRN    magnesium hydroxide (MILK OF MAGNESIA) 400 mg/5 mL oral suspension 30 mL  30 mL Oral DAILY PRN    cloNIDine HCl (CATAPRES) tablet 0.1 mg  0.1 mg Oral Q6H PRN      SCHEDULED MEDICATIONS:   Current Facility-Administered Medications   Medication Dose Route Frequency    amLODIPine (NORVASC) tablet 10 mg  10 mg Oral DAILY    atorvastatin (LIPITOR) tablet 40 mg  40 mg Oral QHS    aspirin delayed-release tablet 81 mg  81 mg Oral DAILY    mirtazapine (REMERON) tablet 15 mg  15 mg Oral QHS          ASSESSMENT & PLAN     DIAGNOSES REQUIRING ACTIVE TREATMENT AND MONITORING: (reviewed/updated 1/20/2020)  Patient Active Hospital Problem List:   Major depressive disorder, recurrent episode with melancholic features ([facility] [address] (3/19/7722)    Assessment: patient with significant situational stressors, poor motivation, hopelessness and frustration as well as a recent history of stimulant use. Suspect combination of underlying depression, demoralization and withdrawal dysphoria. Will optimize medically, treat underlying medical comorbidity and start antidepressant. Plan:  - CONTINUE Remeron 15 mg QHS for MDD  - Appreciate medicine recs  - IGM therapy as tolerated  - Expand database / obtain collateral  - Dispo planning     In summary, Rayo Wang, is a 63 y.o.  female who presents with a severe exacerbation of the principal diagnosis of Major depressive disorder, recurrent episode with melancholic features ([facility] [address]    Patient's condition is improving. Patient requires continued inpatient hospitalization for further stabilization, safety monitoring and medication management. I will continue to coordinate the provision of individual, milieu, occupational, group, and substance abuse therapies to address target symptoms/diagnoses as deemed appropriate for the individual patient. A coordinated, multidisplinary treatment team round was conducted with the patient (this team consists of the nurse, psychiatric unit pharmcist,  and writer). Complete current electronic health record for patient has been reviewed today including consultant notes, ancillary staff notes, nurses and psychiatric tech notes. Suicide risk assessment completed and patient deemed to be of high risk for suicide at this time. The following regarding medications was addressed during rounds with patient:   the risks and benefits of the proposed medication. The patient was given the opportunity to ask questions. Informed consent given to the use of the above medications.  Will continue to adjust psychiatric and non-psychiatric medications (see above \"medication\" section and orders section for details) as deemed appropriate & based upon diagnoses and response to treatment. I will continue to order blood tests/labs and diagnostic tests as deemed appropriate and review results as they become available (see orders for details and above listed lab/test results). I will order psychiatric records from previous [facility] to further elucidate the nature of patient's psychopathology and review once available. I will gather additional collateral information from friends, family and o/p treatment team to further elucidate the nature of patient's psychopathology and baselline level of psychiatric functioning. I certify that this patient's inpatient psychiatric hospital services furnished since the previous certification were, and continue to be, required for treatment that could reasonably be expected to improve the patient's condition, or for diagnostic study, and that the patient continues to need, on a daily basis, active treatment furnished directly by or requiring the supervision of inpatient psychiatric facility personnel. In addition, the hospital records show that services furnished were intensive treatment services, admission or related services, or equivalent services.     EXPECTED DISCHARGE DATE/DAY: 1/21/2020     DISPOSITION: Sister's home       Signed By:   Melissa Victor MD  1/20/2020

## 2020-01-20 NOTE — BH NOTES
PSYCHIATRIC PROGRESS NOTE         Patient Name  Navin Lynne   Date of Birth 1960   [facility] [id number]   Medical Record Number  [id number]      Age  63 y.o. PCP None   Admit date:  1/13/2020    Room Number  329/01  @ [facility]   Date of Service  1/19/2020         E & M PROGRESS NOTE:         HISTORY       CC:  \"suicidal ideation\"  HISTORY OF PRESENT ILLNESS/INTERVAL HISTORY:  (reviewed/updated 1/19/2020). per initial evaluation: The patient, Navin Lynne, is a 63 y.o. BLACK OR  female with a past psychiatric history significant for cocaine use disorder, who presents at this time with complaints of (and/or evidence of) the following emotional symptoms: depression and suicide attempt Overdose of: unknown medication approximately 2 days ago. Additional symptomatology include irritability. The above symptoms have been present for 2+ weeks. These symptoms are of moderate to high severity. These symptoms are constant in nature. The patient's condition has been precipitated by psychosocial stressors. Patient's condition made worse by continued illicit drug use and alcohol use as well as treatment noncompliance. UDS: +cocaine; BAL=0.      The patient is a fair historian. The patient corroborates the above narrative. The patient contracts for safety on the unit and gives consent for the team to contact collateral. The patient is amenable to initiating treatment while on the unit. The patient reports that she has been doing poorly for over a year, due to various external stressors (unemployment, disagreements with her support sister [sister], loss of power and water due to non-payment of utility bills) and decided to end her life. She states she did not specifically use cocaine recently, but can recall using marijuana within a week of admission. She reports not taking medication for HTN and HLD despite a history of stroke. 1/15 - no acute overnight events.  Patient allowed blood draw, and was started on Remeron, which she is tolerating. She is visible, no episodes of self harm but remains depressed; she did not voice active thoughts of self harm this morning. HTN and CAD started agents by medical consult. Patient with mild HLD but elevated LDL, otherwise labwork WNL. Patient counseled on compliance, working with SW to secure a stable discharge plan. 1/16 - patient voiced HI toward her sister and son, but presently denies intent. Patient with ongoing SI, endorses hopelessness, but denies active thoughts of self harm. Per staff, patient stated she made some bizarre statements about \"moving the world\" but does not appear internally preoccupied. Patient states she can't sleep, got trazodone without much effect. Counseled on sleep hygiene. Patient states she would like to her have her oldest sister involved in her care, requesting family meeting. 1/17 - patient isolative to room, slept 5.5 hours after getting Trazodone and Zyprexa. She reports feeling very sedated this morning and requests not getting Zyprexa anymore. Patient denies active thoughts of self harm, but acknowledges hopelessness. Sister contacted for family meeting, awaiting response. 1/18 (weekend coverage) - Treva Ferrell reports that she gets frustrated quickly. She has several complaints on the unit. She is tolerating her medications. She did not sleep well last night. She is visible on the unit and interacts some with peers. 1/19 (weekend coverage) - Treva Ferrell feels \"tired\" today. She expressed HI toward sister to nursing staff. She denies SI/AH/VH. Tolerating medications. Poor sleep last night      SIDE EFFECTS: (reviewed/updated 1/19/2020)  +headache     ALLERGIES:(reviewed/updated 1/19/2020)  No Known Allergies   MEDICATIONS PRIOR TO ADMISSION:(reviewed/updated 1/19/2020)  No medications prior to admission.       PAST MEDICAL HISTORY: Past medical history from the initial psychiatric evaluation has been reviewed (reviewed/updated 1/19/2020) with no additional updates (I asked patient and no additional past medical history provided). Past Medical History:   Diagnosis Date    Hypertension     Stroke [facility]) 2015   History reviewed. No pertinent surgical history. SOCIAL HISTORY: Social history from the initial psychiatric evaluation has been reviewed (reviewed/updated 1/19/2020) with no additional updates (I asked patient and no additional social history provided). Social History     Socioeconomic History    Marital status: SINGLE     Spouse name: Not on file    Number of children: Not on file    Years of education: Not on file    Highest education level: Not on file   Occupational History    Not on file   Social Needs    Financial resource strain: Not on file    Food insecurity:     Worry: Not on file     Inability: Not on file    Transportation needs:     Medical: Not on file     Non-medical: Not on file   Tobacco Use    Smoking status: Current Every Day Smoker    Smokeless tobacco: Never Used   Substance and Sexual Activity    Alcohol use:  Yes    Drug use: Yes     Types: Marijuana    Sexual activity: Not on file   Lifestyle    Physical activity:     Days per week: Not on file     Minutes per session: Not on file    Stress: Not on file   Relationships    Social connections:     Talks on phone: Not on file     Gets together: Not on file     Attends Latter day service: Not on file     Active member of club or organization: Not on file     Attends meetings of clubs or organizations: Not on file     Relationship status: Not on file    Intimate partner violence:     Fear of current or ex partner: Not on file     Emotionally abused: Not on file     Physically abused: Not on file     Forced sexual activity: Not on file   Other Topics Concern    Not on file   Social History Narrative    Not on file      FAMILY HISTORY: Family history from the initial psychiatric evaluation has been reviewed (reviewed/updated 1/19/2020) with no additional updates (I asked patient and no additional family history provided). History reviewed. No pertinent family history. REVIEW OF SYSTEMS: (reviewed/updated 1/19/2020)  Appetite:no change from normal   Sleep: poor with DIMS (difficulty initiating & maintaining sleep)   All other Review of Systems: Negative except per HPI         [facility] (MSE):    MSE FINDINGS ARE WITHIN NORMAL LIMITS (WNL) UNLESS OTHERWISE STATED BELOW. ( ALL OF THE BELOW CATEGORIES OF THE MSE HAVE BEEN REVIEWED (reviewed 1/19/2020) AND UPDATED AS DEEMED APPROPRIATE )  General Presentation age appropriate, cooperative and guarded   Orientation oriented to time, place and person   Vital Signs  See below (reviewed 1/19/2020); Vital Signs (BP, Pulse, & Temp) are within normal limits if not listed below.    Gait and Station Stable/steady, no ataxia   Musculoskeletal System No extrapyramidal symptoms (EPS); no abnormal muscular movements or Tardive Dyskinesia (TD); muscle strength and tone are within normal limits   Language No aphasia or dysarthria   Speech:  normal volume and non-pressured   Thought Processes logical; normal rate of thoughts; fair abstract reasoning/computation   Thought Associations goal directed   Thought Content preoccupations   Suicidal Ideations contracts for safety   Homicidal Ideations none   Mood:  depressed and anhedonia   Affect:  mood-congruent   Memory recent  intact   Memory remote:  intact   Concentration/Attention:  intact   Fund of Knowledge average   Insight:  limited   Reliability fair   Judgment:  improving          VITALS:     Patient Vitals for the past 24 hrs:   Temp Pulse Resp BP SpO2   01/19/20 0739 98 °F (36.7 °C) 82 18 136/90 99 %   01/18/20 2055 97.9 °F (36.6 °C) 84 18 (!) 138/102 99 %     Wt Readings from Last 3 Encounters:   01/13/20 98 kg (216 lb)     Temp Readings from Last 3 Encounters:   01/19/20 98 °F (36.7 °C)     BP Readings from Last 3 Encounters:   01/19/20 136/90     Pulse Readings from Last 3 Encounters:   01/19/20 82            DATA     LABORATORY DATA:(reviewed/updated 1/19/2020)  No results found for this or any previous visit (from the past 24 hour(s)). No results found for: VALF2, VALAC, VALP, VALPR, DS6, CRBAM, CRBAMP, CARB2, XCRBAM  No results found for: LITHM   RADIOLOGY REPORTS:(reviewed/updated 1/19/2020)  No results found.        MEDICATIONS     ALL MEDICATIONS:   Current Facility-Administered Medications   Medication Dose Route Frequency    ibuprofen (MOTRIN) tablet 600 mg  600 mg Oral Q6H PRN    doxepin (SINEquan) capsule 10 mg  10 mg Oral QHS PRN    traZODone (DESYREL) tablet 150 mg  150 mg Oral QHS PRN    nicotine (NICORETTE) gum 2 mg  2 mg Oral Q1H PRN    amLODIPine (NORVASC) tablet 10 mg  10 mg Oral DAILY    atorvastatin (LIPITOR) tablet 40 mg  40 mg Oral QHS    aspirin delayed-release tablet 81 mg  81 mg Oral DAILY    mirtazapine (REMERON) tablet 15 mg  15 mg Oral QHS    haloperidol lactate (HALDOL) injection 5 mg  5 mg IntraMUSCular Q6H PRN    benztropine (COGENTIN) tablet 1 mg  1 mg Oral BID PRN    diphenhydrAMINE (BENADRYL) injection 50 mg  50 mg IntraMUSCular BID PRN    hydrOXYzine HCl (ATARAX) tablet 50 mg  50 mg Oral TID PRN    LORazepam (ATIVAN) injection 1 mg  1 mg IntraMUSCular Q4H PRN    magnesium hydroxide (MILK OF MAGNESIA) 400 mg/5 mL oral suspension 30 mL  30 mL Oral DAILY PRN    cloNIDine HCl (CATAPRES) tablet 0.1 mg  0.1 mg Oral Q6H PRN      SCHEDULED MEDICATIONS:   Current Facility-Administered Medications   Medication Dose Route Frequency    amLODIPine (NORVASC) tablet 10 mg  10 mg Oral DAILY    atorvastatin (LIPITOR) tablet 40 mg  40 mg Oral QHS    aspirin delayed-release tablet 81 mg  81 mg Oral DAILY    mirtazapine (REMERON) tablet 15 mg  15 mg Oral QHS          ASSESSMENT & PLAN     DIAGNOSES REQUIRING ACTIVE TREATMENT AND MONITORING: (reviewed/updated 1/19/2020)  Patient Active Hospital Problem List:   Major depressive disorder, recurrent episode with melancholic features ([facility] (7/63/8855)   Major depressive disorder, recurrent episode with melancholic features ([facility] (2/42/1634)    Assessment: patient with significant situational stressors, poor motivation, hopelessness and frustration as well as a recent history of stimulant use. Suspect combination of underlying depression, demoralization and withdrawal dysphoria. Will optimize medically, treat underlying medical comorbidity and start antidepressant. Plan:  - CONTINUE Remeron 15 mg QHS for MDD, will titrate on HD6  - Appreciate medicine recs  - IGM therapy as tolerated  - Expand database / obtain collateral  - Dispo planning     In summary, Eitan Sutton, is a 63 y.o.  female who presents with a severe exacerbation of the principal diagnosis of Major depressive disorder, recurrent episode with melancholic features ([facility]    Patient's condition is improving. Patient requires continued inpatient hospitalization for further stabilization, safety monitoring and medication management. I will continue to coordinate the provision of individual, milieu, occupational, group, and substance abuse therapies to address target symptoms/diagnoses as deemed appropriate for the individual patient. A coordinated, multidisplinary treatment team round was conducted with the patient (this team consists of the nurse, psychiatric unit pharmcist,  and writer). Complete current electronic health record for patient has been reviewed today including consultant notes, ancillary staff notes, nurses and psychiatric tech notes. Suicide risk assessment completed and patient deemed to be of high risk for suicide at this time. The following regarding medications was addressed during rounds with patient:   the risks and benefits of the proposed medication. The patient was given the opportunity to ask questions.  Informed consent given to the use of the above medications. Will continue to adjust psychiatric and non-psychiatric medications (see above \"medication\" section and orders section for details) as deemed appropriate & based upon diagnoses and response to treatment. I will continue to order blood tests/labs and diagnostic tests as deemed appropriate and review results as they become available (see orders for details and above listed lab/test results). I will order psychiatric records from previous [facility] hospitals to further elucidate the nature of patient's psychopathology and review once available. I will gather additional collateral information from friends, family and o/p treatment team to further elucidate the nature of patient's psychopathology and baselline level of psychiatric functioning. I certify that this patient's inpatient psychiatric hospital services furnished since the previous certification were, and continue to be, required for treatment that could reasonably be expected to improve the patient's condition, or for diagnostic study, and that the patient continues to need, on a daily basis, active treatment furnished directly by or requiring the supervision of inpatient psychiatric facility personnel. In addition, the hospital records show that services furnished were intensive treatment services, admission or related services, or equivalent services.     EXPECTED DISCHARGE DATE/DAY: 1/20/2020     DISPOSITION: Home       Signed By:   Ravindra Jones NP  1/19/2020

## 2020-01-20 NOTE — GROUP NOTE
MAGDAL[facility]  GROUP DOCUMENTATION INDIVIDUAL Group Therapy Note Date: 1/20/2020 Group Start Time: 1000 Group End Time: 1100 Group Topic: Topic Group [facility] ACUTE BEHAV [facility] [place], [address] GROUP DOCUMENTATION GROUP Group Therapy Note Attendees: 9 Attendance: Attended Patient's Goal:  To develop a personal plan for success Interventions/techniques: Supported-positive coping strategies/goals Follows Directions: Followed directions Interactions: Interacted appropriately Mental Status: Calm Behavior/appearance: Attentive and Cooperative Goals Achieved: Able to engage in interactions, Able to listen to others and Discussed coping Additional Notes:  
 
Prem Friend

## 2020-01-20 NOTE — BH NOTES
GROUP THERAPY PROGRESS NOTE    Patient is participating in Discharge Group. Group time: 50 minutes    Personal goal for participation: Process feelings related to discharge and communication issues with friends/family. Goal orientation: Personal    Group therapy participation: active    Therapeutic interventions reviewed and discussed: Group discussion was focused on discharge plans and anxiety related to this. Group members discussed what they planned to do once discharge and discharge plans. Discussion also related to support and communication issues that arise. Impression of participation: Irwin was active in discussion of discharge plans. She did not share specific plans but shared that she has been working on plans with her .     Alexa Miller [facility]

## 2020-01-20 NOTE — PROGRESS NOTES
Problem: Hypertension  Goal: *Blood pressure within specified parameters  Outcome: Progressing Towards Goal  Goal: *Labs within defined limits  Outcome: Progressing Towards Goal     Problem: Suicide  Goal: *STG: Remains safe in hospital  Outcome: Progressing Towards Goal  Goal: *STG: Seeks staff when feelings of self harm or harm towards others arise  Outcome: Progressing Towards Goal  Goal: *STG: Attends activities and groups  Outcome: Progressing Towards Goal  Goal: *STG:  Verbalizes alternative ways of dealing with maladaptive feelings/behaviors  Outcome: Progressing Towards Goal  Goal: *STG/LTG: Complies with medication therapy  Outcome: Progressing Towards Goal  Goal: *STG/LTG: No longer expresses self destructive or suicidal thoughts  Outcome: Progressing Towards Goal     Problem: Depressed Mood (Adult/Pediatric)  Goal: *STG: Participates in treatment plan  Outcome: Progressing Towards Goal  Goal: *STG: Participates in 1:1 therapy sessions  Outcome: Progressing Towards Goal  Goal: *STG: Verbalizes anger, guilt, and other feelings in a constructive manor  Outcome: Progressing Towards Goal  Goal: *STG: Attends activities and groups  Outcome: Progressing Towards Goal  Goal: *STG: Demonstrates reduction in symptoms and increase in insight into coping skills/future focused  Outcome: Progressing Towards Goal  Goal: *STG: Remains safe in hospital  Outcome: Progressing Towards Goal  Goal: *STG: Complies with medication therapy  Outcome: Progressing Towards Goal

## 2020-01-20 NOTE — BH NOTES
GROUP THERAPY PROGRESS NOTE    Patient is participating in Coping Skills group. Group was cancelled early today due to a patient having a medical emergency. Patients were asked to go back to their rooms while emergency was addressed by medical staff. Lesson on feelings will continue tomorrow during morning group.     Felipe Arellano [facility]

## 2020-01-20 NOTE — BH NOTES
GROUP THERAPY PROGRESS NOTE    Patient is participating in Substance abuse group. Group time: 50 minutes    Personal goal for participation: To understand addiction, criteria for diagnosis, and identify triggers and coping skills. Goal orientation: Personal    Group therapy participation: active    Therapeutic interventions reviewed and discussed: Group discussion of substance use, abuse, and dependence and the DSM 5 criteria for a substance use disorder. Patients were able to self-rate themselves based on the 11 criteria for a substance use disorder and explore their own level of addiction for cigarettes, alcohol, heroin, and other substances. Group discussed how they feel when they are unable to use and ways substance use has hindered their lives. Triggers for use and coping skills to avoid use or manage symptoms until craving subsides were discussed. Impression of participation: Treva Ferrell was present but quiet during group. She was alert and oriented and attentive to those sharing. She followed along on the handout. She shared some thoughts on use of various substances.     Jose Maria Do [facility]

## 2020-01-20 NOTE — BH NOTES
Behavioral Health Interdisciplinary Rounds     Patient Name: Milla Lees  Age: 63 y.o. Room/Bed:  329/01  Primary Diagnosis: Major depressive disorder, recurrent episode with melancholic features [facility]   Admission Status: Voluntary     Readmission within 30 days: no  Power of  in place: unknown  Patient requires a blocked bed: no            Reason for blocked bed: n/a  Order for blocked bed obtained: no       Sleep hours:  7. 5 hrs      Participation in Care/Groups:  yes  Medication Compliant?: Yes  PRNS (last 24 hours): Antianxiety and Sleep Aid    Restraints (last 24 hours):  no  Substance Abuse:  yes    24 hour chart check complete: yes     Patient goal(s) for today: Engage in unit activities. Treatment team focus/goals: Dispo planning   Progress note: Pt to go to sister Yoav Khalil home at [address] [place] [address]. SW notified [facility] of tomorrows discharge and will complete assessment int he home at 2:00 PM tomorrow. Pt will discharge at noon. LOS:  7  Expected LOS: 1/21    Financial concerns/prescription coverage: Medicaid  Family contact:  1/20/2020  Family requesting physician contact today: No  Discharge plan:  retirement  Access to weapons: None  Outpatient provider(s): [facility]  Patient's preferred phone number for follow up call:     Participating treatment team members: Rachel Haskins MSW and Dr. Eusebio Guo.

## 2020-01-20 NOTE — GROUP NOTE
[facility]  GROUP DOCUMENTATION INDIVIDUAL Group Therapy Note Date: 1/20/2020 Group Start Time: 1400 Group End Time: 1500 Group Topic: Recreational/Music Therapy [facility] [place] [address] ACUTE BEHAV [facility] [place], [address] GROUP DOCUMENTATION GROUP Group Therapy Note Attendees: 11 Attendance: Attended Patient's Goal:  To concentrate on selected task Interventions/techniques: Supported-crafts,games,music Follows Directions: Followed directions Interactions: Interacted appropriately Mental Status: Calm Behavior/appearance: Attentive and Cooperative Goals Achieved: Able to engage in interactions and Able to listen to others Additional Notes:   
 
Verlan Schirmer

## 2020-01-21 VITALS
DIASTOLIC BLOOD PRESSURE: 91 MMHG | TEMPERATURE: 97.4 F | RESPIRATION RATE: 18 BRPM | SYSTOLIC BLOOD PRESSURE: 131 MMHG | OXYGEN SATURATION: 100 % | BODY MASS INDEX: 32.74 KG/M2 | HEART RATE: 87 BPM | HEIGHT: 68 IN | WEIGHT: 216 LBS

## 2020-01-21 PROCEDURE — 74011250637 HC RX REV CODE- 250/637: Performed by: STUDENT IN AN ORGANIZED HEALTH CARE EDUCATION/TRAINING PROGRAM

## 2020-01-21 PROCEDURE — 74011250637 HC RX REV CODE- 250/637: Performed by: NURSE PRACTITIONER

## 2020-01-21 PROCEDURE — 74011250637 HC RX REV CODE- 250/637: Performed by: FAMILY MEDICINE

## 2020-01-21 RX ORDER — TRAZODONE HYDROCHLORIDE 150 MG/1
150 TABLET ORAL
Qty: 30 TAB | Refills: 0 | Status: SHIPPED | OUTPATIENT
Start: 2020-01-21

## 2020-01-21 RX ADMIN — HYDROXYZINE HYDROCHLORIDE 50 MG: 25 TABLET, FILM COATED ORAL at 09:19

## 2020-01-21 RX ADMIN — AMLODIPINE BESYLATE 10 MG: 5 TABLET ORAL at 09:01

## 2020-01-21 RX ADMIN — Medication 81 MG: at 09:01
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
## 2020-01-21 NOTE — BH NOTES
[facility] Interdisciplinary Rounds     Patient Name: Kelli [place]  Age: 63 y.o. Room/Bed:  329/01  Primary Diagnosis: Major depressive disorder, recurrent episode with melancholic features ([facility].)   Admission Status: (V)     Readmission within 30 days  Power of  in place: Unknown  Patient requires a blocked bed:     Reason for blocked bed: N0    Sleep hours: About 5.5 hours.        Participation in Care/Groups: Yes  Medication Compliant?: Yes  PRNS (last 24 hours): Trazodone, Atarax, Nicotine gum   Restraints (last 24 hours): No  Substance Abuse:  Yes    24 hour chart check complete:    Patient goal(s) for today:  Treatment team focus/goals:   Progress note:     LOS:  8  Expected LOS:     Financial concerns/prescription coverage:    Family contact:     Family requesting physician contact today:    Discharge plan:   Access to weapons:         Outpatient provider(s):   Patient's preferred phone number for follow up call:     Participating treatment team members: Kelil [place], (assigned SW),

## 2020-01-21 NOTE — BH NOTES
Patient alert and verbal. Discharged home to continue recommended plan of care. Discharge instructions reviewed with patient. Patient verbalized understanding. Patient's valuables and belongings returned.  Patient transported home via [address] [facility]

## 2020-01-21 NOTE — DISCHARGE INSTRUCTIONS
DISCHARGE SUMMARY    Adrienne Lopez  : 1960  MRN: [id number]    The patient Berenice Oliveira exhibits the ability to control behavior in a less restrictive environment. Patient's level of functioning is improving. No assaultive/destructive behavior has been observed for the past 24 hours. No suicidal/homicidal threat or behavior has been observed for the past 24 hours. There is no evidence of serious medication side effects. Patient has not been in physical or protective restraints for at least the past 24 hours. If weapons involved, how are they secured? None involved. Is patient aware of and in agreement with discharge plan? Yes. Arrangements for medication:  Prescriptions given to patient. Copy of discharge instructions to provider?:  Cecelia Hodges and [address] [place] [address] for transportation home:  Lyft to sister's home. Keep all follow up appointments as scheduled, continue to take prescribed medications per physician instructions. Mental health crisis number:  [phone] or your local mental health crisis line number at [phone]. DISCHARGE SUMMARY from Nurse    PATIENT INSTRUCTIONS:      What to do at Home:  Recommended activity: Activity as tolerated,       *  Please give a list of your current medications to your Primary Care Provider. *  Please update this list whenever your medications are discontinued, doses are      changed, or new medications (including over-the-counter products) are added. *  Please carry medication information at all times in case of emergency situations. These are general instructions for a healthy lifestyle:    No smoking/ No tobacco products/ Avoid exposure to second hand smoke  Surgeon General's Warning:  Quitting smoking now greatly reduces serious risk to your health.     Obesity, smoking, and sedentary lifestyle greatly increases your risk for illness    A healthy diet, regular physical exercise & weight monitoring are important for maintaining a healthy lifestyle    If I feel I am at risk of hurting myself or others, I will call the crisis office and speak with a crisis worker who will assist me during my crisis. [address]  [phone]  [facility] [place] [address] [phone]  [address] Crisis-  [phone]  [facility]  [phone]  [place] [facility]-  [phone]      Patient Education        Preventing a Relapse of Depression: Care Instructions  Your Care Instructions    A relapse of depression means your symptoms have come back after you have gotten better. This illness often comes and goes during a lifetime. But there are many things you can do to keep it from coming back. Follow-up care is a key part of your treatment and safety. Be sure to make and go to all appointments, and call your doctor if you are having problems. It's also a good idea to know your test results and keep a list of the medicines you take. What do you need to know? Know your risk of relapse  Talk to your doctor to find out if you are at risk of relapse. Many things can make a person more likely to relapse into depression. These include having a family member with depression, dealing with serious problems in a relationship or a job, having a serious medical condition, or substance use disorder. It is important to know your risk and to recognize warning signs of relapse. Once you know these things, you will be better able to keep it from happening to you. Know the warning signs of relapse  The two most common signs of relapse are:  · Feeling sad or hopeless. · Losing interest in your daily activities. You may have other symptoms, such as:  · You lose or gain weight. · You sleep too much or not enough. · You feel restless and unable to sit still. · You feel unable to move. · You feel tired all the time. · You feel unworthy or guilty without an obvious reason.   · You have problems concentrating, remembering, or making decisions. · You think often about death or suicide. · You feel angry or have panic attacks. How can you care for yourself at home? · Take your medicine as prescribed. Call your doctor if you have any problems with your medicine. Many people take their medicines for at least 6 months after they have recovered. This often helps keep symptoms from coming back. However, if your depression keeps coming back, you may have to take medicine for the rest of your life. · Continue counseling even after you have stopped taking medicine. · Eat healthy foods. Include fruits, vegetables, beans, and whole grains in your diet each day. · Get at least 30 minutes of exercise on most days of the week. Walking is a good choice. You also may want to do other activities, such as running, swimming, cycling, or playing tennis or team sports. · See your doctor right away if you have new symptoms or feel that your depression is coming back. · Keep a regular sleep schedule. Try for 8 hours of sleep a night. · Avoid alcohol and illegal drugs. · Keep the numbers for these national suicide hotlines: 2-250-728-TALK (1-895.580.2392) and 6-693-BACTSMR (2-930.875.5282). If you or someone you know talks about suicide or feeling hopeless, get help right away. When should you call for help? Call 911 anytime you think you may need emergency care.  For example, call if:    · You are thinking about suicide or are threatening suicide.     · You feel you cannot stop from hurting yourself or someone else.     · You hear or see things that aren't real.     · You think or speak in a bizarre way that is not like your usual behavior.    Call your doctor now or seek immediate medical care if:    · You are drinking a lot of alcohol or using illegal drugs.     · You are talking or writing about death.    Watch closely for changes in your health, and be sure to contact your doctor if:    · You find it hard or it's getting harder to deal with school, a job, family, or friends.     · You think your treatment is not helping or you are not getting better.     · Your symptoms get worse or you get new symptoms.     · You have any problems with your antidepressant medicines, such as side effects, or you are thinking about stopping your medicine.     · You are having manic behavior, such as having very high energy, needing less sleep than normal, or showing risky behavior such as spending money you don't have or abusing others verbally or physically. Where can you learn more? Go to http://kirby-angela.info/. Enter S484 in the search box to learn more about \"Preventing a Relapse of Depression: Care Instructions. \"  Current as of: May 28, 2019  Content Version: 12.2  © 2931-2122 [employer]. Care instructions adapted under license by [employer] (which disclaims liability or warranty for this information). If you have questions about a medical condition or this instruction, always ask your healthcare professional. [place] [address] any warranty or liability for your use of this information. The discharge information has been reviewed with the patient. The patient verbalized understanding. Discharge medications reviewed with the patient and appropriate educational materials and side effects teaching were provided.   ___________________________________________________________________________________________________________________________________

## 2020-09-22 ENCOUNTER — HOSPITAL ENCOUNTER (OUTPATIENT)
Dept: GENERAL RADIOLOGY | Age: 60
Discharge: HOME | End: 2020-09-22
Payer: COMMERCIAL

## 2020-09-22 DIAGNOSIS — R10.816 EPIGASTRIC ABDOMINAL TENDERNESS WITHOUT REBOUND TENDERNESS: ICD-10-CM

## 2020-09-22 PROCEDURE — 74018 RADEX ABDOMEN 1 VIEW: CPT

## 2021-09-15 NOTE — DISCHARGE SUMMARY
PSYCHIATRIC DISCHARGE SUMMARY         IDENTIFICATION:    Patient Name  Tim Suresh   Date of Birth 1960   [facility] [id number]   Medical Record Number  [id number]      Age  63 y.o. PCP None   Admit date:  1/13/2020    Discharge date: 1/21/2020   Room Number  329/01  @ [address]   Date of Service  1/21/2020            TYPE OF DISCHARGE: REGULAR               CONDITION AT DISCHARGE: improved and stable       PROVISIONAL & DISCHARGE DIAGNOSES:    Problem List  Never Reviewed          Codes Class    Adjustment disorder with mixed disturbance of emotions and conduct ICD-10-CM: F43.25  ICD-9-CM: 309.4         Cocaine use disorder ([facility] ICD-10-CM: F14.10  ICD-9-CM: 305.60         * (Principal) Major depressive disorder, recurrent episode with melancholic features ([facility] PEP-18-UZ: F33.9  ICD-9-CM: 296.30               Active Hospital Problems    Adjustment disorder with mixed disturbance of emotions and conduct      Cocaine use disorder ([facility]      *Major depressive disorder, recurrent episode with melancholic features ([facility]        DISCHARGE DIAGNOSIS:   Axis I:  SEE ABOVE  Axis II: SEE ABOVE  Axis III: SEE ABOVE  Axis IV:  lack of structure  Axis V:  20 on admission, 70 on discharge     CC & HISTORY OF PRESENT ILLNESS:  \"suicidal ideation\"    The Tip Rivera, is a 60 y. o.  BLACK OR  female with a past psychiatric history significant for cocaine use disorder, who presents at this time with complaints of (and/or evidence of) the following emotional symptoms: depression and suicide attempt Overdose of: unknown medication approximately 2 days ago.  Additional symptomatology include irritability.  The above symptoms have been present for 2+ weeks. These symptoms are of moderate to high severity.  These symptoms are constant in nature.  The patient's condition has been precipitated by psychosocial stressors.  Patient's condition made worse by continued illicit drug use and alcohol use as well as treatment noncompliance. UDS: +cocaine; BAL=0.      The patient is a fair historian. The patient corroborates the above narrative. The patient contracts for safety on the unit and gives consent for the team to contact collateral. The patient is amenable to initiating treatment while on the unit. The patient reports that she has been doing poorly for over a year, due to various external stressors (unemployment, disagreements with her support sister [sister], loss of power and water due to non-payment of utility bills) and decided to end her life. She states she did not specifically use cocaine recently, but can recall using marijuana within a week of admission. She reports not taking medication for HTN and HLD despite a history of stroke.     1/15 - no acute overnight events. Patient allowed blood draw, and was started on Remeron, which she is tolerating. She is visible, no episodes of self harm but remains depressed; she did not voice active thoughts of self harm this morning. HTN and CAD started agents by medical consult. Patient with mild HLD but elevated LDL, otherwise labwork WNL. Patient counseled on compliance, working with SW to secure a stable discharge plan.     1/16 - patient voiced HI toward her sister and son, but presently denies intent. Patient with ongoing SI, endorses hopelessness, but denies active thoughts of self harm. Per staff, patient stated she made some bizarre statements about \"moving the world\" but does not appear internally preoccupied. Patient states she can't sleep, got trazodone without much effect. Counseled on sleep hygiene. Patient states she would like to her have her oldest sister involved in her care, requesting family meeting.     1/17 - patient isolative to room, slept 5.5 hours after getting Trazodone and Zyprexa. She reports feeling very sedated this morning and requests not getting Zyprexa anymore.  Patient denies active thoughts of self harm, but acknowledges hopelessness. Sister contacted for family meeting, awaiting response.     1/20 - patient has been visible, slept 7 hours, compliant with Remeron. Patient has been in contact with her older sister, who's home she will be discharged to tomorrow AM. Patient without any specific concerns about her current medication regimen, but endorses side effects of dry mouth; patient encouraged to remain hydrated. She denies SI/AVH, but does endorse HI toward her family at times due to ongoing perception of no support. SOCIAL HISTORY:    Social History     Socioeconomic History    Marital status: SINGLE     Spouse name: Not on file    Number of children: Not on file    Years of education: Not on file    Highest education level: Not on file   Occupational History    Not on file   Social Needs    Financial resource strain: Not on file    Food insecurity:     Worry: Not on file     Inability: Not on file    Transportation needs:     Medical: Not on file     Non-medical: Not on file   Tobacco Use    Smoking status: Current Every Day Smoker    Smokeless tobacco: Never Used   Substance and Sexual Activity    Alcohol use: Yes    Drug use: Yes     Types: Marijuana    Sexual activity: Not on file   Lifestyle    Physical activity:     Days per week: Not on file     Minutes per session: Not on file    Stress: Not on file   Relationships    Social connections:     Talks on phone: Not on file     Gets together: Not on file     Attends Muslim service: Not on file     Active member of club or organization: Not on file     Attends meetings of clubs or organizations: Not on file     Relationship status: Not on file    Intimate partner violence:     Fear of current or ex partner: Not on file     Emotionally abused: Not on file     Physically abused: Not on file     Forced sexual activity: Not on file   Other Topics Concern    Not on file   Social History Narrative    Not on file      FAMILY HISTORY:   History reviewed.  No pertinent family history. HOSPITALIZATION COURSE:    Rayo Wang was admitted to the inpatient psychiatric unit [facility] for acute psychiatric stabilization in regards to symptomatology as described in the HPI above. The differential diagnosis at time of admission included: substance induced depressive disorder vs MDD vs adjustment disorder. While on the unit Rayo Wang was involved in individual, group, occupational and milieu therapy. Psychiatric medications were adjusted during this hospitalization including Remeron. Rayo Wang demonstrated a slow, but progressive improvement in overall condition. Much of patient's initial presentation appeared to be related to situational stressors, effects of medication non-compliance drugs of abuse, and psychological factors. Please see individual progress notes for more specific details regarding patient's hospitalization course. Patient with request for discharge today. There are no grounds to seek a TDO. At time of discharge, Rayo Wang is without significant problems of depression, psychosis, or morales. Patient free of suicidal and homicidal ideations (appears to be at very low risk of suicide or homicide) and reports many positive predictive factors in terms of not attempting suicide or homicide. Overall presentation at time of discharge is most consistent with the diagnosis of major depressive disorder and cocaine use disorder. Patient has maximized benefit to be derived from acute inpatient psychiatric treatment. All members of the treatment team concur with each other in regards to plans for discharge today. Patient and family are aware and in agreement with discharge and discharge plan.          LABS AND IMAGAING:    Labs Reviewed   DRUG SCREEN, URINE - Abnormal; Notable for the following components:       Result Value    COCAINE POSITIVE (*)     All other components within normal limits   CBC WITH AUTOMATED DIFF - Abnormal; Notable for the following components:    MPV 8.6 (*)     All other components within normal limits   METABOLIC PANEL, COMPREHENSIVE - Abnormal; Notable for the following components:    Chloride 110 (*)     Anion gap 4 (*)     Creatinine 1.16 (*)     BUN/Creatinine ratio 11 (*)     GFR est AA 58 (*)     GFR est non-AA 48 (*)     AST (SGOT) 12 (*)     A-G Ratio 0.9 (*)     All other components within normal limits   LIPID PANEL - Abnormal; Notable for the following components:    Cholesterol, total 215 (*)     LDL, calculated 119.4 (*)     All other components within normal limits   HEMOGLOBIN A1C WITH EAG - Abnormal; Notable for the following components:    Hemoglobin A1c 5.9 (*)     All other components within normal limits   URINALYSIS W/ REFLEX CULTURE   ETHYL ALCOHOL   TSH 3RD GENERATION     No results found for: DS35, PHEN, PHENO, PHENT, DILF, DS39, PHENY, PTN, VALF2, VALAC, VALP, VALPR, DS6, CRBAM, CRBAMP, CARB2, XCRBAM  Admission on 01/13/2020, Discharged on 01/21/2020   Component Date Value Ref Range Status    AMPHETAMINES 01/13/2020 NEGATIVE   NEG   Final    BARBITURATES 01/13/2020 NEGATIVE   NEG   Final    BENZODIAZEPINES 01/13/2020 NEGATIVE   NEG   Final    COCAINE 01/13/2020 POSITIVE* NEG   Final    METHADONE 01/13/2020 NEGATIVE   NEG   Final    OPIATES 01/13/2020 NEGATIVE   NEG   Final    PCP(PHENCYCLIDINE) 01/13/2020 NEGATIVE   NEG   Final    THC (TH-CANNABINOL) 01/13/2020 NEGATIVE   NEG   Final    Drug screen comment 01/13/2020 (NOTE)   Final    Color 01/13/2020 YELLOW/STRAW    Final    Appearance 01/13/2020 CLEAR  CLEAR   Final    Specific gravity 01/13/2020 1.020  1.003 - 1.030   Final    pH (UA) 01/13/2020 7.5  5.0 - 8.0   Final    Protein 01/13/2020 NEGATIVE   NEG mg/dL Final    Glucose 01/13/2020 NEGATIVE   NEG mg/dL Final    Ketone 01/13/2020 NEGATIVE   NEG mg/dL Final    Bilirubin 01/13/2020 NEGATIVE   NEG   Final    Blood 01/13/2020 NEGATIVE   NEG   Final    Urobilinogen 01/13/2020 1.0  0.2 - 1.0 EU/dL Final    Nitrites 01/13/2020 NEGATIVE   NEG   Final    Leukocyte Esterase 01/13/2020 NEGATIVE   NEG   Final    WBC 01/13/2020 0-4  0 - 4 /hpf Final    RBC 01/13/2020 0-5  0 - 5 /hpf Final    Epithelial cells 01/13/2020 FEW  FEW /lpf Final    Bacteria 01/13/2020 NEGATIVE   NEG /hpf Final    UA:UC IF INDICATED 01/13/2020 CULTURE NOT INDICATED BY UA RESULT  CNI   Final    WBC 01/13/2020 5.8  3.6 - 11.0 K/uL Final    RBC 01/13/2020 4.61  3.80 - 5.20 M/uL Final    HGB 01/13/2020 13.0  11.5 - 16.0 g/dL Final    HCT 01/13/2020 41.2  35.0 - 47.0 % Final    MCV 01/13/2020 89.4  80.0 - 99.0 FL Final    MCH 01/13/2020 28.2  26.0 - 34.0 PG Final    MCHC 01/13/2020 31.6  30.0 - 36.5 g/dL Final    RDW 01/13/2020 13.2  11.5 - 14.5 % Final    PLATELET 26/86/0205 482  150 - 400 K/uL Final    MPV 01/13/2020 8.6* 8.9 - 12.9 FL Final    NRBC 01/13/2020 0.0  0  WBC Final    ABSOLUTE NRBC 01/13/2020 0.00  0.00 - 0.01 K/uL Final    NEUTROPHILS 01/13/2020 51  32 - 75 % Final    LYMPHOCYTES 01/13/2020 39  12 - 49 % Final    MONOCYTES 01/13/2020 7  5 - 13 % Final    EOSINOPHILS 01/13/2020 2  0 - 7 % Final    BASOPHILS 01/13/2020 1  0 - 1 % Final    IMMATURE GRANULOCYTES 01/13/2020 0  0.0 - 0.5 % Final    ABS. NEUTROPHILS 01/13/2020 3.0  1.8 - 8.0 K/UL Final    ABS. LYMPHOCYTES 01/13/2020 2.3  0.8 - 3.5 K/UL Final    ABS. MONOCYTES 01/13/2020 0.4  0.0 - 1.0 K/UL Final    ABS. EOSINOPHILS 01/13/2020 0.1  0.0 - 0.4 K/UL Final    ABS. BASOPHILS 01/13/2020 0.0  0.0 - 0.1 K/UL Final    ABS. IMM.  GRANS. 01/13/2020 0.0  0.00 - 0.04 K/UL Final    DF 01/13/2020 AUTOMATED    Final    Sodium 01/13/2020 144  136 - 145 mmol/L Final    Potassium 01/13/2020 4.2  3.5 - 5.1 mmol/L Final    Chloride 01/13/2020 110* 97 - 108 mmol/L Final    CO2 01/13/2020 30  21 - 32 mmol/L Final    Anion gap 01/13/2020 4* 5 - 15 mmol/L Final    Glucose 01/13/2020 91  65 - 100 mg/dL Final  BUN 01/13/2020 13  6 - 20 MG/DL Final    Creatinine 01/13/2020 1.16* 0.55 - 1.02 MG/DL Final    BUN/Creatinine ratio 01/13/2020 11* 12 - 20   Final    GFR est AA 01/13/2020 58* >60 ml/min/1.73m2 Final    GFR est non-AA 01/13/2020 48* >60 ml/min/1.73m2 Final    Calcium 01/13/2020 9.0  8.5 - 10.1 MG/DL Final    Bilirubin, total 01/13/2020 0.3  0.2 - 1.0 MG/DL Final    ALT (SGPT) 01/13/2020 20  12 - 78 U/L Final    AST (SGOT) 01/13/2020 12* 15 - 37 U/L Final    Alk. phosphatase 01/13/2020 110  45 - 117 U/L Final    Protein, total 01/13/2020 7.4  6.4 - 8.2 g/dL Final    Albumin 01/13/2020 3.5  3.5 - 5.0 g/dL Final    Globulin 01/13/2020 3.9  2.0 - 4.0 g/dL Final    A-G Ratio 01/13/2020 0.9* 1.1 - 2.2   Final    ALCOHOL(ETHYL),SERUM 01/13/2020 <10  <10 MG/DL Final    LIPID PROFILE 01/15/2020        Final    Cholesterol, total 01/15/2020 215* <200 MG/DL Final    Triglyceride 01/15/2020 118  <150 MG/DL Final    HDL Cholesterol 01/15/2020 72  MG/DL Final    LDL, calculated 01/15/2020 119.4* 0 - 100 MG/DL Final    VLDL, calculated 01/15/2020 23.6  MG/DL Final    CHOL/HDL Ratio 01/15/2020 3.0  0.0 - 5.0   Final    TSH 01/15/2020 2.07  0.36 - 3.74 uIU/mL Final    Hemoglobin A1c 01/15/2020 5.9* 4.0 - 5.6 % Final    Est. average glucose 01/15/2020 123  mg/dL Final     No results found. DISPOSITION:    Home. Patient to f/u with psychiatric and psychotherapy appointments. Patient is to f/u with internist as directed. FOLLOW-UP CARE:    Activity as tolerated  Regular diet  Wound Care: none needed. Follow-up Information     Follow up With Specialties Details Why Contact Info    [facility]  Go in 1 day Please attend appointment for ongoing mental health case management, therapy and medication management.    [address] [facility]   Address: [address] [facility], [address]  Phone: [phone]  Fax: [phone]  Rapid access appointment hours:  Monday - Friday: 8:00 AM - 2:00 PM      [facility]   Go today  Mental health clinician Kellee Pham will be coming to your sister's home today at 2:00 PM to complete initial intake for mental health skill building services. Address: [address] , [facility], [address]  Phone: [phone]    Dr. Zhen Galindo on 1/28/2020 Primary care appointment on Tuesday, January 28th at [facility]  Address: [place], [address]  Phone: [phone]  Fax: [phone]    None    None (395) Patient stated that they have no PCP                   PROGNOSIS:   Fair ---- based on nature of patient's pathology/ies and treatment compliance issues. Prognosis is greatly dependent upon patient's ability to remain sober and to follow up with scheduled appointments as well as to comply with psychiatric medications as prescribed. DISCHARGE MEDICATIONS:     Informed consent given for the use of following psychotropic medications:  Discharge Medication List as of 1/21/2020 10:39 AM      START taking these medications    Details   amLODIPine (NORVASC) 10 mg tablet Take 1 Tab by mouth daily. Indications: high blood pressure, Print, Disp-30 Tab, R-0      aspirin delayed-release 81 mg tablet Take 1 Tab by mouth daily. Indications: prevention for a blood clot going to the brain, Print, Disp-30 Tab, R-0      atorvastatin (LIPITOR) 40 mg tablet Take 1 Tab by mouth nightly. Indications: excessive fat in the blood, Print, Disp-30 Tab, R-0      mirtazapine (REMERON) 15 mg tablet Take 1 Tab by mouth nightly. Indications: major depressive disorder, Print, Disp-30 Tab, R-0                    A coordinated, multidisplinary treatment team round was conducted with Michael Pérez is done daily here at [facility]. This team consists of the nurse, psychiatric unit pharmacist,  and writer. I have spent greater than 35 minutes on discharge work.     Signed:  Tia Hanson MD  1/21/2020 Patient requests all Lab, Cardiology, and Radiology Results on their Discharge Instructions

## 2022-03-18 PROBLEM — F33.9 MAJOR DEPRESSIVE DISORDER, RECURRENT EPISODE WITH MELANCHOLIC FEATURES (HCC): Status: ACTIVE | Noted: 2020-01-14

## 2022-03-18 PROBLEM — F43.25 ADJUSTMENT DISORDER WITH MIXED DISTURBANCE OF EMOTIONS AND CONDUCT: Status: ACTIVE | Noted: 2020-01-14

## 2022-03-19 PROBLEM — F14.10 COCAINE USE DISORDER (HCC): Status: ACTIVE | Noted: 2020-01-14

## 2022-07-09 NOTE — BH NOTES
1700 Keith James care of patient from [facility], [address]  Patient found to be in the dayroom pleasant, calm and cooperative with the RN. She denies pain. Denies auditory or visual hallucinations. Does not appear to be responding to internal stimuli. Admits to suicidal ideation with no plan, and states she is \"almost always homicidal\". Eating well. States she is not sleeping well but did sleep 7.5 hours last night. Admits to becoming easily agitated with people. 2133  Patient compliant with evening medications. Did receive PRN Nicorette Gum and Trazodone    0010  Patient remains awake  Did receive PRN Sinequan as a second attempt at a sleep aid following Trazodone. 0215  Patient resting quietly in bed at this time. Respirations are even and non labored.   Will continue to monitor per patient plan of care    0300  Patient resting quietly    1329  Patient continues to erst quietly at this time    [phone]  Patient slept approximately 6.5 hours NPO status maintained. Linder catheter in placed (placed by previous shift)

## 2023-05-10 RX ORDER — MIRTAZAPINE 15 MG/1
TABLET, FILM COATED ORAL
COMMUNITY
Start: 2020-01-20

## 2023-05-10 RX ORDER — TRAZODONE HYDROCHLORIDE 150 MG/1
TABLET ORAL
COMMUNITY
Start: 2020-01-21

## 2023-05-10 RX ORDER — AMLODIPINE BESYLATE 10 MG/1
TABLET ORAL DAILY
COMMUNITY
Start: 2020-01-21

## 2023-05-10 RX ORDER — ASPIRIN 81 MG/1
TABLET ORAL DAILY
COMMUNITY
Start: 2020-01-21

## 2023-05-10 RX ORDER — ATORVASTATIN CALCIUM 40 MG/1
TABLET, FILM COATED ORAL
COMMUNITY
Start: 2020-01-20